# Patient Record
Sex: FEMALE | NOT HISPANIC OR LATINO | Employment: STUDENT | URBAN - METROPOLITAN AREA
[De-identification: names, ages, dates, MRNs, and addresses within clinical notes are randomized per-mention and may not be internally consistent; named-entity substitution may affect disease eponyms.]

---
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
## 2019-03-10 DIAGNOSIS — F41.9 ANXIETY AND DEPRESSION: ICD-10-CM

## 2019-03-10 DIAGNOSIS — F32.A ANXIETY AND DEPRESSION: ICD-10-CM

## 2019-03-11 DIAGNOSIS — G43.009 MIGRAINE WITHOUT AURA AND WITHOUT STATUS MIGRAINOSUS, NOT INTRACTABLE: ICD-10-CM

## 2019-03-11 RX ORDER — SERTRALINE HYDROCHLORIDE 100 MG/1
TABLET, FILM COATED ORAL
Qty: 30 TABLET | Refills: 3 | Status: SHIPPED | OUTPATIENT
Start: 2019-03-11 | End: 2019-07-13 | Stop reason: SDUPTHER

## 2019-03-11 RX ORDER — TOPIRAMATE 25 MG/1
25 TABLET ORAL 2 TIMES DAILY
Qty: 60 TABLET | Refills: 5 | Status: SHIPPED | OUTPATIENT
Start: 2019-03-11 | End: 2019-07-25

## 2019-03-11 NOTE — TELEPHONE ENCOUNTER
Please clarify RX, note states she started topamax twice daily, I don't think trokendi was approved.

## 2019-03-12 RX ORDER — TOPIRAMATE 25 MG/1
CAPSULE, EXTENDED RELEASE ORAL
Refills: 0 | OUTPATIENT
Start: 2019-03-12

## 2019-07-13 DIAGNOSIS — F32.A ANXIETY AND DEPRESSION: ICD-10-CM

## 2019-07-13 DIAGNOSIS — F41.9 ANXIETY AND DEPRESSION: ICD-10-CM

## 2019-07-15 RX ORDER — SERTRALINE HYDROCHLORIDE 100 MG/1
TABLET, FILM COATED ORAL
Qty: 30 TABLET | Refills: 2 | Status: SHIPPED | OUTPATIENT
Start: 2019-07-15 | End: 2019-07-25

## 2019-07-25 ENCOUNTER — OFFICE VISIT (OUTPATIENT)
Dept: FAMILY MEDICINE CLINIC | Facility: CLINIC | Age: 19
End: 2019-07-25

## 2019-07-25 VITALS
HEART RATE: 102 BPM | SYSTOLIC BLOOD PRESSURE: 142 MMHG | OXYGEN SATURATION: 100 % | TEMPERATURE: 97.8 F | RESPIRATION RATE: 20 BRPM | WEIGHT: 270 LBS | BODY MASS INDEX: 43.39 KG/M2 | DIASTOLIC BLOOD PRESSURE: 80 MMHG | HEIGHT: 66 IN

## 2019-07-25 DIAGNOSIS — G43.009 MIGRAINE WITHOUT AURA AND WITHOUT STATUS MIGRAINOSUS, NOT INTRACTABLE: ICD-10-CM

## 2019-07-25 DIAGNOSIS — G43.009 MIGRAINE WITHOUT AURA AND WITHOUT STATUS MIGRAINOSUS, NOT INTRACTABLE: Primary | ICD-10-CM

## 2019-07-25 DIAGNOSIS — F32.A ANXIETY AND DEPRESSION: Primary | ICD-10-CM

## 2019-07-25 DIAGNOSIS — F41.9 ANXIETY AND DEPRESSION: Primary | ICD-10-CM

## 2019-07-25 PROCEDURE — 99213 OFFICE O/P EST LOW 20 MIN: CPT | Performed by: NURSE PRACTITIONER

## 2019-07-25 RX ORDER — TOPIRAMATE 50 MG/1
50 CAPSULE, EXTENDED RELEASE ORAL
Qty: 30 CAPSULE | Refills: 5 | Status: SHIPPED | OUTPATIENT
Start: 2019-07-25 | End: 2019-10-30

## 2019-07-25 RX ORDER — ESCITALOPRAM OXALATE 10 MG/1
10 TABLET ORAL DAILY
Qty: 30 TABLET | Refills: 1 | Status: SHIPPED | OUTPATIENT
Start: 2019-07-25 | End: 2020-01-14

## 2019-07-25 NOTE — PROGRESS NOTES
Subjective   Gorge Barksdale is a 19 y.o. female presents with increased anxiety and panic attacks. 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 She is currently taking zoloft 100 mg, states it worked initially, but no significant change since it was increased.     She is also having more headaches. She stopped taking topamax as her insurance would not cover this medication. Still having migraines. Interested in a neuro referral.    History of Present Illness     The following portions of the patient's history were reviewed and updated as appropriate: allergies, current medications, past family history, past medical history, past social history, past surgical history and problem list.    Review of Systems   Constitutional: Negative.    Eyes: Negative.    Respiratory: Negative.    Cardiovascular: Negative.    Gastrointestinal: Negative.    Endocrine: Negative.    Genitourinary: Negative.    Musculoskeletal: Negative.    Skin: Negative.    Allergic/Immunologic: Negative.    Neurological: Positive for headaches.   Hematological: Negative.    Psychiatric/Behavioral: Positive for decreased concentration and dysphoric mood. Negative for self-injury, sleep disturbance and suicidal ideas. The patient is nervous/anxious. The patient is not hyperactive.        Objective   Physical Exam   Constitutional: She is oriented to person, place, and time. She appears well-developed and well-nourished.   Neurological: She is alert and oriented to person, place, and time.   Skin: Skin is warm and dry.   Psychiatric: She has a normal mood and affect.   Vitals reviewed.      Assessment/Plan   Gorge was seen today for headache.    Diagnoses and all orders for this visit:    Anxiety and depression    Migraine without aura and without status migrainosus, not intractable        D/c zoloft  Instructed to take half tab x 7 days, if tolerating and d/c  Then start lexapro 10 mg daily  Follow up in one month, consider  increase in lexapro    Start trokendi 50 mg daily, rx card given, should be zero out of pocket, will change to topiramate if needed due to coverage  Recommend patient follow up with [facility] for further eval of anxiety/depression as well as counseling  Continue maxalt  Recommend labs in future, ok to obtain with derm labs,

## 2019-07-30 RX ORDER — NORGESTIMATE AND ETHINYL ESTRADIOL 0.25-0.035
1 KIT ORAL DAILY
Qty: 84 TABLET | Refills: 3 | Status: SHIPPED | OUTPATIENT
Start: 2019-07-30 | End: 2020-01-14

## 2019-10-14 ENCOUNTER — TELEPHONE (OUTPATIENT)
Dept: FAMILY MEDICINE CLINIC | Facility: CLINIC | Age: 19
End: 2019-10-14

## 2019-10-14 NOTE — TELEPHONE ENCOUNTER
Left voicemail for patient to call the office so we could discuss this issue.     ----- Message from Latanya Peter MA sent at 10/11/2019  4:48 PM EDT -----      ----- Message -----  From: Miri Segundo APRN  Sent: 10/10/2019   3:26 PM  To: KIAH Pike Dr. will not see patient without imaging. Does she have any imaging (CT/MRI of her head) previously with another provider? If not, she will need to come in for a visit to discuss her symptoms and determine which testing is warranted.    Miri  ----- Message -----  From: Iris Newman  Sent: 10/10/2019   2:06 PM  To: YAZMIN Mays    Per Dr Benitez office pt needs imaging before they will see her ... Please advise  ----- Message -----  From: Miri Segundo APRN  Sent: 10/10/2019  11:48 AM  To: Iris Newman    Can you follow up with a referral to neurology? Patient has not heard anything. It was for [facility], Dr. Kidd, I believe.     Thanks,    Miri

## 2019-10-16 RX ORDER — SERTRALINE HYDROCHLORIDE 100 MG/1
TABLET, FILM COATED ORAL
Qty: 30 TABLET | Refills: 0 | Status: SHIPPED | OUTPATIENT
Start: 2019-10-16 | End: 2019-11-11 | Stop reason: SDUPTHER

## 2019-10-30 RX ORDER — TOPIRAMATE 50 MG/1
50 TABLET, FILM COATED ORAL 2 TIMES DAILY
Qty: 60 TABLET | Refills: 3 | Status: SHIPPED | OUTPATIENT
Start: 2019-10-30 | End: 2020-01-14

## 2019-11-12 RX ORDER — SERTRALINE HYDROCHLORIDE 100 MG/1
TABLET, FILM COATED ORAL
Qty: 30 TABLET | Refills: 0 | Status: SHIPPED | OUTPATIENT
Start: 2019-11-12 | End: 2019-12-30

## 2019-12-30 RX ORDER — SERTRALINE HYDROCHLORIDE 100 MG/1
TABLET, FILM COATED ORAL
Qty: 30 TABLET | Refills: 3 | Status: SHIPPED | OUTPATIENT
Start: 2019-12-30 | End: 2020-01-14

## 2020-01-14 ENCOUNTER — OFFICE VISIT (OUTPATIENT)
Dept: FAMILY MEDICINE CLINIC | Facility: CLINIC | Age: 20
End: 2020-01-14

## 2020-01-14 VITALS
OXYGEN SATURATION: 99 % | SYSTOLIC BLOOD PRESSURE: 122 MMHG | DIASTOLIC BLOOD PRESSURE: 80 MMHG | TEMPERATURE: 98.7 F | HEART RATE: 114 BPM | BODY MASS INDEX: 43.17 KG/M2 | WEIGHT: 267.3 LBS

## 2020-01-14 DIAGNOSIS — F32.A ANXIETY AND DEPRESSION: ICD-10-CM

## 2020-01-14 DIAGNOSIS — G43.009 MIGRAINE WITHOUT AURA AND WITHOUT STATUS MIGRAINOSUS, NOT INTRACTABLE: ICD-10-CM

## 2020-01-14 DIAGNOSIS — F41.9 ANXIETY AND DEPRESSION: ICD-10-CM

## 2020-01-14 DIAGNOSIS — L40.9 PSORIASIS: Primary | ICD-10-CM

## 2020-01-14 PROCEDURE — 99214 OFFICE O/P EST MOD 30 MIN: CPT | Performed by: NURSE PRACTITIONER

## 2020-01-14 RX ORDER — RIZATRIPTAN BENZOATE 10 MG/1
10 TABLET ORAL ONCE AS NEEDED
Qty: 10 TABLET | Refills: 2 | Status: SHIPPED | OUTPATIENT
Start: 2020-01-14 | End: 2021-02-02

## 2020-01-14 RX ORDER — CLOBETASOL PROPIONATE 0.5 MG/G
CREAM TOPICAL 2 TIMES DAILY
Qty: 60 G | Refills: 2 | Status: SHIPPED | OUTPATIENT
Start: 2020-01-14

## 2020-01-14 RX ORDER — TOPIRAMATE 100 MG/1
100 TABLET, FILM COATED ORAL 2 TIMES DAILY
Qty: 180 TABLET | Refills: 2 | Status: SHIPPED | OUTPATIENT
Start: 2020-01-14 | End: 2020-11-18

## 2020-01-14 RX ORDER — SERTRALINE HYDROCHLORIDE 100 MG/1
150 TABLET, FILM COATED ORAL DAILY
Qty: 135 TABLET | Refills: 0 | Status: SHIPPED | OUTPATIENT
Start: 2020-01-14 | End: 2020-04-13

## 2020-01-14 NOTE — PROGRESS NOTES
Subjective   Gorge Barksdale is a 19 y.o. female   who presents for   Chief Complaint   Patient presents with   • Rash     x3wks left side of body and both arms           /80   Pulse 114   Temp 98.7 °F (37.1 °C)   Wt 121 kg (267 lb 4.8 oz)   SpO2 99%   BMI 43.17 kg/m²       History of Present Illness   Rash   This is a new problem. The current episode started 1 to 4 weeks ago. The problem has been waxing and waning since onset. The affected locations include theneck, chest, back, abdomen, left arm, left hand, left wrist, right arm, right hand and right wrist. The rash is characterized by dryness, redness, itchiness, peeling and scaling. She was exposed to a new detergent/soap. Pertinent negatives include no anorexia, congestion, cough, diarrhea, eye pain, facial edema, fatigue, fever, joint pain, nail changes, rhinorrhea, shortness of breath, sore throat or vomiting. Past treatments include nothing. The treatment provided no relief. (Psoriasis)   Anxiety   Presents for follow-up visit. Symptoms include depressed mood (when at home), excessive worry and nervous/anxious behavior. Patient reports no chest pain, compulsions, confusion, decreased concentration, dizziness, dry mouth, feeling of choking, hyperventilation, impotence, insomnia, irritability, malaise, muscle tension, nausea, obsessions, palpitations, panic, restlessness, shortness of breath or suicidal ideas. Symptoms occur most days. The severity of symptoms is moderate. The quality of sleep is good.     (Psoriasis)       The following portions of the patient's history were reviewed and updated as appropriate: allergies, current medications, past family history, past medical history, past social history, past surgical history and problem list.    Review of Systems  Review of Systems   Constitutional: Negative for fatigue, fever and irritability.   HENT: Negative for congestion, rhinorrhea and sore throat.    Eyes: Negative for pain.   Respiratory:  Negative for cough and shortness of breath.    Cardiovascular: Negative for chest pain and palpitations.   Gastrointestinal: Negative for anorexia, diarrhea, nausea and vomiting.   Genitourinary: Negative for impotence.   Musculoskeletal: Negative for joint pain.   Skin: Positive for rash. Negative for nail changes.   Neurological: Positive for headaches. Negative for dizziness.   Psychiatric/Behavioral: Positive for dysphoric mood. Negative for confusion, decreased concentration and suicidal ideas. The patient is nervous/anxious. The patient does not have insomnia.        Objective   Physical Exam  Physical Exam   Constitutional: She is oriented to person, place, and time. She appears well-developed and well-nourished. No distress.   Neck: Neck supple.   Cardiovascular: Normal rate, regular rhythm and normal heart sounds. Exam reveals no gallop and no friction rub.   No murmur heard.  Pulmonary/Chest: Effort normal and breath sounds normal. No respiratory distress. She has no wheezes. She has no rales.   Abdominal: Soft. Bowel sounds are normal. She exhibits no distension. There is no tenderness.   Neurological: She is alert and oriented to person, place, and time.   Skin: Skin is warm and dry. Rash noted. She is not diaphoretic.   Bilateral arms and chest  Dry, erythematous, no plaque, no vesicles, pruritic     Psychiatric: She has a normal mood and affect.         Assessment/Plan   Gorge was seen today for rash.    Diagnoses and all orders for this visit:    Psoriasis    Anxiety and depression    Migraine without aura and without status migrainosus, not intractable    Other orders  -     clobetasol (TEMOVATE) 0.05 % cream; Apply  topically to the appropriate area as directed 2 (Two) Times a Day.  -     sertraline (ZOLOFT) 100 MG tablet; Take 1.5 tablets by mouth Daily.  -     rizatriptan (MAXALT) 10 MG tablet; Take 1 tablet by mouth 1 (One) Time As Needed for Migraine for up to 1 dose. May repeat in 2 hours if  needed  -     topiramate (TOPAMAX) 100 MG tablet; Take 1 tablet by mouth 2 (Two) Times a Day.    hx of psoriasis, resume clobetasol, for continued/worsening symptoms, referral to derm  Does have increased anxiety and depression  Will increase zoloft to 150 mg daily to better improve symptoms  Will increase topamax to 100 mg twice daily, slow taper given  Follow up for worsening or no improvement in symptoms

## 2020-01-14 NOTE — PROGRESS NOTES
Answers for HPI/ROS submitted by the patient on 1/13/2020   Rash  Chronicity: new  Onset: 1 to 4 weeks ago  Progression since onset: waxing and waning  Affected locations: neck, chest, back, abdomen, left arm, left hand, left wrist, right arm, right hand, right wrist  Characteristics: dryness, redness, itchiness, peeling, scaling  Exposed to: a new detergent/soap  anorexia: No  congestion: No  cough: No  diarrhea: No  eye pain: No  facial edema: No  fatigue: No  fever: No  joint pain: No  nail changes: No  rhinorrhea: No  shortness of breath: No  sore throat: No  vomiting: No

## 2020-01-14 NOTE — PATIENT INSTRUCTIONS
Increase topiramate to 50 mg in am and 75 mg in pm x 7 days  Then increase to 75 mg twice daily x 7 days  Then start new prescription    Increase zoloft to 1 1/2 tab daily as directed    Maxalt, increase to 10 mg as needed for migraines    Clobetasol, twice daily as needed for rash  Follow up for no improvement/worsening symptoms

## 2020-02-14 ENCOUNTER — OFFICE VISIT (OUTPATIENT)
Dept: FAMILY MEDICINE CLINIC | Facility: CLINIC | Age: 20
End: 2020-02-14

## 2020-02-14 VITALS
RESPIRATION RATE: 16 BRPM | BODY MASS INDEX: 43.74 KG/M2 | TEMPERATURE: 98.9 F | HEIGHT: 65 IN | HEART RATE: 112 BPM | WEIGHT: 262.5 LBS | OXYGEN SATURATION: 98 %

## 2020-02-14 DIAGNOSIS — L40.0 PLAQUE PSORIASIS: ICD-10-CM

## 2020-02-14 DIAGNOSIS — G43.009 MIGRAINE WITHOUT AURA AND WITHOUT STATUS MIGRAINOSUS, NOT INTRACTABLE: ICD-10-CM

## 2020-02-14 DIAGNOSIS — Z82.61 FAMILY HISTORY OF RHEUMATOID ARTHRITIS: ICD-10-CM

## 2020-02-14 DIAGNOSIS — M25.50 POLYARTHRALGIA: ICD-10-CM

## 2020-02-14 DIAGNOSIS — Z13.29 SCREENING FOR THYROID DISORDER: ICD-10-CM

## 2020-02-14 DIAGNOSIS — Z79.899 MEDICATION MANAGEMENT: ICD-10-CM

## 2020-02-14 DIAGNOSIS — Z13.21 ENCOUNTER FOR VITAMIN DEFICIENCY SCREENING: ICD-10-CM

## 2020-02-14 DIAGNOSIS — F32.A ANXIETY AND DEPRESSION: Primary | ICD-10-CM

## 2020-02-14 DIAGNOSIS — F41.9 ANXIETY AND DEPRESSION: Primary | ICD-10-CM

## 2020-02-14 DIAGNOSIS — Z13.220 SCREENING FOR LIPOID DISORDERS: ICD-10-CM

## 2020-02-14 DIAGNOSIS — Z82.61 FAMILY HISTORY OF JUVENILE RHEUMATOID ARTHRITIS: ICD-10-CM

## 2020-02-14 PROCEDURE — 99214 OFFICE O/P EST MOD 30 MIN: CPT | Performed by: NURSE PRACTITIONER

## 2020-02-14 NOTE — PROGRESS NOTES
"Subjective   Gorge Barksdale is a 19 y.o. female   who presents for   Chief Complaint   Patient presents with   • Anxiety     F/u   • Depression     Medications helping  Increased acid reflux, has noticed with increase in zoloft    Maxalt, for migraines    Recent hand/foot mouth after last visit    Started topamax slowly    Knee pain, joint pain generalized, started elementary/middle school, noticed with someone touching her arm and feeling increased sensation, and prolonged, worse over time  Sometimes clothes will bother her skin, concerned for fibromyalgia  Family hx of RA and lupus    Started counseling, thinks she has acute stress disorder  Discussed psychiatry, has not established care, [place] [facility], has not made an appointment,     Parasthesias, on topamax, intermittent, not constant        Pulse 112   Temp 98.9 °F (37.2 °C)   Resp 16   Ht 165.1 cm (65\")   Wt 119 kg (262 lb 8 oz)   SpO2 98%   BMI 43.68 kg/m²       History of Present Illness   Anxiety   Presents for follow-up visit. Symptoms include depressed mood, excessive worry, nervous/anxious behavior and panic. Patient reports no chest pain, dizziness, insomnia, nausea, shortness of breath or suicidal ideas. The severity of symptoms is interfering with daily activities. The quality of sleep is fair. Nighttime awakenings: occasional.     Compliance with medications is %.   Migraine    This is a chronic problem. The current episode started more than 1 year ago. The problem occurs intermittently. The problem has been waxing and waning. The pain does not radiate. The pain quality is similar to prior headaches. The pain is at a severity of 0/10. The patient is experiencing no pain. Associated symptoms include phonophobia and photophobia. Pertinent negatives include no abdominal pain, abnormal behavior, anorexia, back pain, blurred vision, coughing, dizziness, drainage, ear pain, eye pain, eye redness, eye watering, facial sweating, fever, " hearing loss, insomnia, loss of balance, muscle aches, nausea, neck pain, numbness, rhinorrhea, scalp tenderness, seizures, sinus pressure, sore throat, swollen glands, tingling (related to topamax), tinnitus, visual change, vomiting, weakness or weight loss. The symptoms are aggravated by bright light. She has tried triptans (topa,ax) for the symptoms. The treatment provided moderate relief. Her past medical history is significant for migraine headaches.   Pain   This is a chronic problem. The current episode started more than 1 year ago. The problem occurs intermittently. The problem has been waxing and waning. Associated symptoms include arthralgias (polyarthralgia) and fatigue. Pertinent negatives include no abdominal pain, anorexia, change in bowel habit, chest pain, chills, congestion, coughing, diaphoresis, fever, headaches, joint swelling, myalgias, nausea, neck pain, numbness, rash, sore throat, swollen glands, urinary symptoms, vertigo, visual change, vomiting or weakness. Nothing aggravates the symptoms. She has tried nothing for the symptoms. The treatment provided no relief.       The following portions of the patient's history were reviewed and updated as appropriate: allergies, current medications, past family history, past medical history, past social history, past surgical history and problem list.    Review of Systems  Review of Systems   Constitutional: Positive for fatigue. Negative for chills, diaphoresis, fever and weight loss.   HENT: Negative for congestion, ear pain, hearing loss, rhinorrhea, sinus pressure, sore throat and tinnitus.    Eyes: Positive for photophobia. Negative for blurred vision, pain and redness.   Respiratory: Negative for cough and shortness of breath.    Cardiovascular: Negative for chest pain.   Gastrointestinal: Negative for abdominal pain, anorexia, change in bowel habit, nausea and vomiting.   Musculoskeletal: Positive for arthralgias (polyarthralgia). Negative for  back pain, joint swelling, myalgias and neck pain.   Skin: Negative for rash.   Neurological: Negative for dizziness, vertigo, tingling (related to topamax), seizures, weakness, numbness, headaches and loss of balance.   Psychiatric/Behavioral: Negative for suicidal ideas. The patient is nervous/anxious. The patient does not have insomnia.        Objective   Physical Exam  Physical Exam   Constitutional: She is oriented to person, place, and time. She appears well-developed and well-nourished. No distress.   Neck: Neck supple.   Cardiovascular: Normal rate, regular rhythm and normal heart sounds. Exam reveals no gallop and no friction rub.   No murmur heard.  Pulmonary/Chest: Effort normal and breath sounds normal. No respiratory distress. She has no wheezes. She has no rales.   Abdominal: Soft. Bowel sounds are normal. She exhibits no distension. There is no tenderness.   Neurological: She is alert and oriented to person, place, and time.   Skin: Skin is warm and dry. She is not diaphoretic.   Psychiatric: She has a normal mood and affect.         Assessment/Plan   Gorge was seen today for anxiety and depression.    Diagnoses and all orders for this visit:    Anxiety and depression    Migraine without aura and without status migrainosus, not intractable    Plaque psoriasis    Family history of juvenile rheumatoid arthritis    Family history of rheumatoid arthritis  -     C-reactive Protein; Future  -     Rheumatoid Factor, Quant; Future  -     Rheumatoid Factor, Quant  -     C-reactive Protein    Polyarthralgia  -     KATHARINA; Future  -     C-reactive Protein; Future  -     Rheumatoid Factor, Quant; Future  -     Rheumatoid Factor, Quant  -     C-reactive Protein  -     KATHARINA    Screening for thyroid disorder  -     TSH Rfx On Abnormal To Free T4; Future  -     TSH Rfx On Abnormal To Free T4    Encounter for vitamin deficiency screening  -     Vitamin D 25 hydroxy; Future  -     Vitamin D 25 hydroxy    Medication  management  -     CBC & Differential; Future  -     Comprehensive metabolic panel; Future  -     Comprehensive metabolic panel  -     CBC & Differential    Screening for lipoid disorders  -     Lipid Panel With LDL / HDL Ratio; Future  -     Lipid Panel With LDL / HDL Ratio    if autoimmune work up appears wnl, would consider cymbalta for suspected fibromyalgia  Currently taking zoloft and doing well, discussed with patient and agreeable with recommendation, states her mom is currently on that medication  Labs in future, anxiety related to giving blood, will give time to prepare  Continue current medication for migraines and anxiety at present time  Hx of psoriasis, managed well currently    Time spent > 25 minutes discussing medications, labs, treatment process and next steps    Answers for HPI/ROS submitted by the patient on 2/11/2020   What is the primary reason for your visit?: Other  Please describe your symptoms.: Medication follow up  Have you had these symptoms before?: No

## 2020-04-13 RX ORDER — SERTRALINE HYDROCHLORIDE 100 MG/1
TABLET, FILM COATED ORAL
Qty: 45 TABLET | Refills: 3 | Status: SHIPPED | OUTPATIENT
Start: 2020-04-13 | End: 2020-09-30 | Stop reason: SDUPTHER

## 2020-09-30 RX ORDER — SERTRALINE HYDROCHLORIDE 100 MG/1
150 TABLET, FILM COATED ORAL DAILY
Qty: 135 TABLET | Refills: 1 | Status: SHIPPED | OUTPATIENT
Start: 2020-09-30 | End: 2021-04-14

## 2020-11-18 RX ORDER — TOPIRAMATE 100 MG/1
TABLET, FILM COATED ORAL
Qty: 60 TABLET | Refills: 4 | Status: SHIPPED | OUTPATIENT
Start: 2020-11-18 | End: 2021-05-19 | Stop reason: SDUPTHER

## 2021-02-02 RX ORDER — RIZATRIPTAN BENZOATE 10 MG/1
TABLET ORAL
Qty: 10 TABLET | Refills: 1 | Status: SHIPPED | OUTPATIENT
Start: 2021-02-02 | End: 2022-04-19 | Stop reason: SDUPTHER

## 2021-04-14 RX ORDER — SERTRALINE HYDROCHLORIDE 100 MG/1
TABLET, FILM COATED ORAL
Qty: 45 TABLET | Refills: 0 | Status: SHIPPED | OUTPATIENT
Start: 2021-04-14 | End: 2021-05-19 | Stop reason: SDUPTHER

## 2021-05-19 ENCOUNTER — OFFICE VISIT (OUTPATIENT)
Dept: FAMILY MEDICINE CLINIC | Facility: CLINIC | Age: 21
End: 2021-05-19

## 2021-05-19 VITALS
DIASTOLIC BLOOD PRESSURE: 84 MMHG | TEMPERATURE: 96.6 F | HEIGHT: 65 IN | BODY MASS INDEX: 47.42 KG/M2 | HEART RATE: 109 BPM | WEIGHT: 284.6 LBS | OXYGEN SATURATION: 99 % | SYSTOLIC BLOOD PRESSURE: 106 MMHG | RESPIRATION RATE: 16 BRPM

## 2021-05-19 DIAGNOSIS — Z30.02 ENCOUNTER FOR COUNSELING AND INSTRUCTION IN NATURAL FAMILY PLANNING TO AVOID PREGNANCY: ICD-10-CM

## 2021-05-19 DIAGNOSIS — F32.A ANXIETY AND DEPRESSION: Primary | ICD-10-CM

## 2021-05-19 DIAGNOSIS — F41.9 ANXIETY AND DEPRESSION: Primary | ICD-10-CM

## 2021-05-19 DIAGNOSIS — G43.009 MIGRAINE WITHOUT AURA AND WITHOUT STATUS MIGRAINOSUS, NOT INTRACTABLE: ICD-10-CM

## 2021-05-19 PROCEDURE — 99214 OFFICE O/P EST MOD 30 MIN: CPT | Performed by: NURSE PRACTITIONER

## 2021-05-19 RX ORDER — SERTRALINE HYDROCHLORIDE 100 MG/1
150 TABLET, FILM COATED ORAL DAILY
Qty: 135 TABLET | Refills: 3 | Status: SHIPPED | OUTPATIENT
Start: 2021-05-19 | End: 2022-04-19 | Stop reason: SDUPTHER

## 2021-05-19 RX ORDER — TOPIRAMATE 100 MG/1
100 TABLET, FILM COATED ORAL 2 TIMES DAILY
Qty: 180 TABLET | Refills: 3 | Status: SHIPPED | OUTPATIENT
Start: 2021-05-19 | End: 2022-04-19 | Stop reason: SDUPTHER

## 2021-05-19 NOTE — PROGRESS NOTES
"Chief Complaint  Med Management    Subjective          Gorge Barksdale presents to [facility]  Recently diagnosed with bipolar I, started on lamictal 25 mg, states she felt heavy and was unable to get out of bed  Having increased anxiety, concerned with sleep, insomnia, trouble sleeping prolonged periods, does sleep in the day at times, now lives with her boyfriend and is having trouble sleeping when he is gone, he works night shifts.     Her migraines have worsened over the past several months, taking topamax 100 mg twice daily, and it did work initially, but attributes lack of sleep to the increase in headaches.            Anxiety  Presents for follow-up visit. Symptoms include decreased concentration, depressed mood (mild), excessive worry, irritability, nervous/anxious behavior, panic and restlessness. Patient reports no shortness of breath or suicidal ideas. Symptoms occur most days. The quality of sleep is poor. Nighttime awakenings: several.     Compliance with medications: takes daily. Treatment side effects: sexual        Objective   Vital Signs:   /84   Pulse 109   Temp 96.6 °F (35.9 °C) (Temporal)   Resp 16   Ht 165.1 cm (65\")   Wt 129 kg (284 lb 9.6 oz)   SpO2 99%   BMI 47.36 kg/m²     Physical Exam  Vitals reviewed.   Constitutional:       Appearance: Normal appearance. She is obese.   Cardiovascular:      Rate and Rhythm: Normal rate and regular rhythm.      Heart sounds: No murmur heard.   No friction rub. No gallop.    Pulmonary:      Effort: Pulmonary effort is normal. No respiratory distress.      Breath sounds: Normal breath sounds. No wheezing, rhonchi or rales.   Abdominal:      General: Bowel sounds are normal. There is no distension.      Palpations: There is no mass.      Tenderness: There is no abdominal tenderness.      Hernia: No hernia is present.   Neurological:      Mental Status: She is alert and oriented to person, place, and time. "   Psychiatric:         Mood and Affect: Mood normal.        Result Review :                 Assessment and Plan {CC Problem List  Visit Diagnosis   ROS  Review (Popup)  Health Maintenance  Quality  BestPractice  Medications  SmartSets  SnapShot Encounters  Media :23}   Diagnoses and all orders for this visit:    1. Anxiety and depression (Primary)    2. Migraine without aura and without status migrainosus, not intractable    3. Encounter for counseling and instruction in natural family planning to avoid pregnancy    Other orders  -     topiramate (TOPAMAX) 100 MG tablet; Take 1 tablet by mouth 2 (Two) Times a Day.  Dispense: 180 tablet; Refill: 3  -     sertraline (ZOLOFT) 100 MG tablet; Take 1.5 tablets by mouth Daily.  Dispense: 135 tablet; Refill: 3        Follow Up   No follow-ups on file.  Patient was given instructions and counseling regarding her condition or for health maintenance advice. Please see specific information pulled into the AVS if appropriate.     Will continue current medication  She did establish with psychiatry, diagnosed with bipolar I, started on lamictal but was unable to tolerate the dose. She was told her symptoms were related to her anxiety, and she was frustrated and did not go back.   I do recommend patient establish with psychiatry, Dr. Fish, [facility],   Discussed ADD vs Bipolar and she states she has considered ADD, but her mom told her she did not have that in comparison to her brother. Advised boys/men present differently than females, and it is worth evaluating.   She is having sexual side effects on the zoloft, would consider changing, but she is reluctant, again will defer to psychiatry  Insomnia: she is interested in a sleep aid, recommend melatonin, she has previously tried 10 mg, recommend 2.5-3 mg gummy or ODT 45 minutes before bedtime, instructed to turn off t.v. and remove cell phone from immediate area x 45 minutes as well as the blue light  stimulates the brain.   She is interested in OCP, however she is taking topamax 200 mg and effects could be reduced.  Answers for HPI/ROS submitted by the patient on 5/15/2021  Please describe your symptoms.: Medication check up  Have you had these symptoms before?: No  How long have you been having these symptoms?: 1-4 days  What is the primary reason for your visit?: Other

## 2021-05-20 RX ORDER — ACETAMINOPHEN AND CODEINE PHOSPHATE 120; 12 MG/5ML; MG/5ML
1 SOLUTION ORAL DAILY
Qty: 28 TABLET | Refills: 12 | Status: SHIPPED | OUTPATIENT
Start: 2021-05-20 | End: 2022-04-19 | Stop reason: SDUPTHER

## 2021-07-28 RX ORDER — FLUCONAZOLE 150 MG/1
TABLET ORAL
Qty: 2 TABLET | Refills: 0 | Status: SHIPPED | OUTPATIENT
Start: 2021-07-28 | End: 2023-01-03

## 2022-01-04 ENCOUNTER — TELEPHONE (OUTPATIENT)
Dept: FAMILY MEDICINE CLINIC | Facility: CLINIC | Age: 22
End: 2022-01-04

## 2022-01-04 NOTE — TELEPHONE ENCOUNTER
Caller: LITA     Relationship to patient: GRANDMOTHER     Best call back number: [phone]    Date of positive COVID19 test: 1-3-22    COVID19 symptoms: CONGESTION, CHEST HURTS FROM COUGHING     Additional information or concerns: GRANDMOTHER IS NOT ON VERBAL, STATED SHE CAN GET JOSIE' MOTHER ON THE PHONE IF NEEDED.   VERY CONCERNED ABOUT PATIENT, ASKING IF THERE IS ANYTHING GRANDMOTHER CAN DO TO HELP RELIEVE SYMPTOMS.    What is the patients preferred pharmacy:   [id number] [address] [phone] [place] [phone]

## 2022-01-04 NOTE — TELEPHONE ENCOUNTER
Spoke to Patient's Mom. Pt's symptoms started Sunday, she he hasn't been able to really eat or drink and she is throwing stuff back up.She is also SOB. After discussing with Miri we have decided to send them to the ER for further Eval and IV hydration. They verified understanding

## 2022-04-02 ENCOUNTER — HOSPITAL ENCOUNTER (EMERGENCY)
Facility: HOSPITAL | Age: 22
Discharge: HOME | End: 2022-04-02
Attending: EMERGENCY MEDICINE | Admitting: EMERGENCY MEDICINE

## 2022-04-02 VITALS
HEIGHT: 65 IN | RESPIRATION RATE: 16 BRPM | HEART RATE: 91 BPM | OXYGEN SATURATION: 100 % | TEMPERATURE: 97.7 F | BODY MASS INDEX: 43.01 KG/M2 | SYSTOLIC BLOOD PRESSURE: 115 MMHG | DIASTOLIC BLOOD PRESSURE: 74 MMHG | WEIGHT: 258.16 LBS

## 2022-04-02 DIAGNOSIS — F41.0 ANXIETY DISORDER DUE TO GENERAL MEDICAL CONDITION WITH PANIC ATTACK: Primary | ICD-10-CM

## 2022-04-02 DIAGNOSIS — F06.4 ANXIETY DISORDER DUE TO GENERAL MEDICAL CONDITION WITH PANIC ATTACK: Primary | ICD-10-CM

## 2022-04-02 LAB — SARS-COV-2 RNA PNL SPEC NAA+PROBE: NOT DETECTED

## 2022-04-02 PROCEDURE — 99283 EMERGENCY DEPT VISIT LOW MDM: CPT

## 2022-04-02 PROCEDURE — C9803 HOPD COVID-19 SPEC COLLECT: HCPCS | Performed by: EMERGENCY MEDICINE

## 2022-04-02 PROCEDURE — 87635 SARS-COV-2 COVID-19 AMP PRB: CPT | Performed by: EMERGENCY MEDICINE

## 2022-04-14 ENCOUNTER — TELEPHONE (OUTPATIENT)
Dept: FAMILY MEDICINE CLINIC | Facility: CLINIC | Age: 22
End: 2022-04-14

## 2022-04-14 NOTE — TELEPHONE ENCOUNTER
Caller: Gorge Barksdale    Relationship: Self    Best call back number: [phone]*    What is the best time to reach you: ANYTIME AFTER 12PM    Who are you requesting to speak with (clinical staff, provider,  specific staff member): YAZMIN GOLDMAN    What was the call regarding: PATIENT CALLING REQUESTING A CALL BACK FROM MICHELLE MANTILLA. THE PATIENT STATES THAT MICHELLE MANTILLA HAD GIVEN HER THE NAME OF SOMEONE THAT SHE RECOMMENDED HER TO SEE AT [facility], AND THE PATIENT CANNOT REMEMBER WHO IT WAS.    Do you require a callback: YES

## 2022-04-19 ENCOUNTER — OFFICE VISIT (OUTPATIENT)
Dept: FAMILY MEDICINE CLINIC | Facility: CLINIC | Age: 22
End: 2022-04-19

## 2022-04-19 VITALS
OXYGEN SATURATION: 98 % | BODY MASS INDEX: 43.07 KG/M2 | HEART RATE: 77 BPM | WEIGHT: 258.5 LBS | HEIGHT: 65 IN | TEMPERATURE: 96.9 F | DIASTOLIC BLOOD PRESSURE: 78 MMHG | SYSTOLIC BLOOD PRESSURE: 124 MMHG | RESPIRATION RATE: 18 BRPM

## 2022-04-19 DIAGNOSIS — F41.9 ANXIETY: ICD-10-CM

## 2022-04-19 DIAGNOSIS — Z00.00 ANNUAL PHYSICAL EXAM: Primary | ICD-10-CM

## 2022-04-19 PROCEDURE — 99213 OFFICE O/P EST LOW 20 MIN: CPT | Performed by: NURSE PRACTITIONER

## 2022-04-19 PROCEDURE — 99395 PREV VISIT EST AGE 18-39: CPT | Performed by: NURSE PRACTITIONER

## 2022-04-19 RX ORDER — TOPIRAMATE 100 MG/1
100 TABLET, FILM COATED ORAL 2 TIMES DAILY
Qty: 180 TABLET | Refills: 3 | Status: SHIPPED | OUTPATIENT
Start: 2022-04-19

## 2022-04-19 RX ORDER — ACETAMINOPHEN AND CODEINE PHOSPHATE 120; 12 MG/5ML; MG/5ML
1 SOLUTION ORAL DAILY
Qty: 28 TABLET | Refills: 12 | Status: SHIPPED | OUTPATIENT
Start: 2022-04-19 | End: 2023-04-19

## 2022-04-19 RX ORDER — RIZATRIPTAN BENZOATE 10 MG/1
10 TABLET ORAL ONCE
Qty: 10 TABLET | Refills: 1 | Status: SHIPPED | OUTPATIENT
Start: 2022-04-19 | End: 2022-04-19

## 2022-04-19 RX ORDER — HYDROXYZINE HYDROCHLORIDE 10 MG/1
10 TABLET, FILM COATED ORAL EVERY 8 HOURS PRN
Qty: 60 TABLET | Refills: 0 | Status: SHIPPED | OUTPATIENT
Start: 2022-04-19 | End: 2022-07-28

## 2022-04-19 RX ORDER — SERTRALINE HYDROCHLORIDE 100 MG/1
150 TABLET, FILM COATED ORAL DAILY
Qty: 135 TABLET | Refills: 3 | Status: SHIPPED | OUTPATIENT
Start: 2022-04-19 | End: 2023-01-03

## 2022-04-19 NOTE — PROGRESS NOTES
"Chief Complaint  Anxiety and Depression    Subjective          Gorge Barksdale presents to [facility]  Gorge is a 21 year old female presenting for an annual exam and medication follow up. Her medical history includes anxiety and panic attacks. She is currently taking Zoloft 150 mg daily. The Zoloft has helped most with her social anxiety. She reports that she would not be able to do things like date or attend medical appointments without Zoloft. She reports having anywhere from 1-3 panic attacks daily. Some panic attacks have been associated with vomiting and diarrhea. She went to the ER for a \"mental breakdown\" on 04/02 where she was evaluated by [facility] who recommended an intensive outpatient partial hospitalization program. She did not receive any new prescriptions. She has social anxiety and is not interested in doing group therapy through [facility]. The outpatient program was also too expensive. She plans to call psychiatry to schedule an appointment today. She feels like her anxiety is causing some depression that occurs on and off. She denies any thoughts of harming or killing herself. She has more migraine headaches when she is depressed but migraines are otherwise well controlled. She is due for pap smear but would like to hold off on that and other lab work until her anxiety is well controlled. She is up to date on all immunizations but unsure about date of last Tdap.           Objective   Vital Signs:   /78 (BP Location: Left arm, Patient Position: Sitting, Cuff Size: Adult)   Pulse 77   Temp 96.9 °F (36.1 °C) (Temporal)   Resp 18   Ht 165.1 cm (65\")   Wt 117 kg (258 lb 8 oz)   SpO2 98%   BMI 43.02 kg/m²            Physical Exam  Constitutional:       Appearance: Normal appearance.   HENT:      Head: Normocephalic and atraumatic.      Right Ear: Tympanic membrane, ear canal and external ear normal.      Left Ear: Tympanic membrane, ear canal and external ear " normal.      Nose: Nose normal.      Mouth/Throat:      Mouth: Mucous membranes are moist.      Pharynx: Oropharynx is clear. Uvula midline.   Eyes:      Conjunctiva/sclera: Conjunctivae normal.      Pupils: Pupils are equal, round, and reactive to light.   Cardiovascular:      Rate and Rhythm: Normal rate and regular rhythm.      Heart sounds: Normal heart sounds.   Pulmonary:      Effort: Pulmonary effort is normal.      Breath sounds: Normal breath sounds. No wheezing, rhonchi or rales.   Musculoskeletal:      Cervical back: Neck supple.   Lymphadenopathy:      Cervical: No cervical adenopathy.   Skin:     General: Skin is warm and dry.   Neurological:      General: No focal deficit present.      Mental Status: She is alert and oriented to person, place, and time.   Psychiatric:         Mood and Affect: Mood normal.         Behavior: Behavior normal.        Result Review :                 Assessment and Plan    Diagnoses and all orders for this visit:    1. Annual physical exam (Primary)    2. Anxiety    Other orders  -     hydrOXYzine (ATARAX) 10 MG tablet; Take 1 tablet by mouth Every 8 (Eight) Hours As Needed for Anxiety.  Dispense: 60 tablet; Refill: 0  -     topiramate (TOPAMAX) 100 MG tablet; Take 1 tablet by mouth 2 (Two) Times a Day.  Dispense: 180 tablet; Refill: 3  -     sertraline (ZOLOFT) 100 MG tablet; Take 1.5 tablets by mouth Daily.  Dispense: 135 tablet; Refill: 3  -     rizatriptan (MAXALT) 10 MG tablet; Take 1 tablet by mouth 1 (One) Time for 1 dose. AT ONSET OF HEADACHE MAY REPEAT ONE TABLET IN 2 HOURS IF NEEDED  Dispense: 10 tablet; Refill: 1  -     norethindrone (Ortho Micronor) 0.35 MG tablet; Take 1 tablet by mouth Daily.  Dispense: 28 tablet; Refill: 12        Follow Up   No follow-ups on file.  Patient was given instructions and counseling regarding her condition or for health maintenance advice. Please see specific information pulled into the AVS if appropriate.     I supervised care  provided by the [facility] Student, Zoila Gallo. We have discussed the case. I have reviewed  the note and agree with the plan of treatment. I personally interviewed the patient and examined the patient.      Information/counseling provided to the patient regarding periodic nilam maintenance recommendations, including but not limited to immunizations, diet/exercise/healthy lifestyle, laboratory, and other screenings. BMI is discussed. Appropriate exercise, diet, and weight plans are discussed.    Anxiety: not controlled, having daily panic attacks. Currently on zoloft 150 mg daily, tried buspar but increased depression. Recommend hydroxyzine 10 mg TID prn, she is establishing care with [facility]ry at [facility], previously established with therapist but placed on hold secondary to pandemic and cost. 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 She would also like to wait on GYN    Weight is down 26 lbs from last visit  Answers for HPI/ROS submitted by the patient on 4/15/2022  Please describe your symptoms.: Medication checkup.  Have you had these symptoms before?: No  How long have you been having these symptoms?: 1-4 days  Please list any medications you are currently taking for this condition.: Zoloft  Please describe any probable cause for these symptoms. : Anxiety  What is the primary reason for your visit?: Other

## 2022-07-25 NOTE — TELEPHONE ENCOUNTER
Rx Refill Note  Requested Prescriptions     Pending Prescriptions Disp Refills   • hydrOXYzine (ATARAX) 10 MG tablet [Pharmacy Med Name: hydrOXYzine HCL 10 MG TABLET] 60 tablet 0     Sig: TAKE ONE TABLET BY MOUTH EVERY 8 HOURS AS NEEDED FOR ANXIETY      Last office visit with prescribing clinician: 4/19/2022      Next office visit with prescribing clinician: Visit date not found            Vijaya Sánchez LPN  07/25/22, 13:41 EDT

## 2022-07-28 RX ORDER — HYDROXYZINE HYDROCHLORIDE 10 MG/1
TABLET, FILM COATED ORAL
Qty: 60 TABLET | Refills: 0 | Status: SHIPPED | OUTPATIENT
Start: 2022-07-28

## 2023-01-03 ENCOUNTER — OFFICE VISIT (OUTPATIENT)
Dept: FAMILY MEDICINE CLINIC | Facility: CLINIC | Age: 23
End: 2023-01-03
Payer: COMMERCIAL

## 2023-01-03 ENCOUNTER — HOSPITAL ENCOUNTER (OUTPATIENT)
Dept: GENERAL RADIOLOGY | Facility: HOSPITAL | Age: 23
Discharge: HOME | End: 2023-01-03
Admitting: NURSE PRACTITIONER
Payer: COMMERCIAL

## 2023-01-03 VITALS
BODY MASS INDEX: 40.62 KG/M2 | WEIGHT: 243.8 LBS | OXYGEN SATURATION: 98 % | SYSTOLIC BLOOD PRESSURE: 114 MMHG | RESPIRATION RATE: 18 BRPM | DIASTOLIC BLOOD PRESSURE: 68 MMHG | HEART RATE: 113 BPM | TEMPERATURE: 97.3 F | HEIGHT: 65 IN

## 2023-01-03 DIAGNOSIS — F41.9 ANXIETY: Primary | ICD-10-CM

## 2023-01-03 DIAGNOSIS — M54.41 CHRONIC BILATERAL LOW BACK PAIN WITH BILATERAL SCIATICA: ICD-10-CM

## 2023-01-03 DIAGNOSIS — M54.42 CHRONIC BILATERAL LOW BACK PAIN WITH BILATERAL SCIATICA: ICD-10-CM

## 2023-01-03 DIAGNOSIS — M54.41 CHRONIC BILATERAL LOW BACK PAIN WITH BILATERAL SCIATICA: Primary | ICD-10-CM

## 2023-01-03 DIAGNOSIS — G89.29 CHRONIC BILATERAL LOW BACK PAIN WITH BILATERAL SCIATICA: ICD-10-CM

## 2023-01-03 DIAGNOSIS — Z78.9 HEALTH MAINTENANCE ALTERATION: ICD-10-CM

## 2023-01-03 DIAGNOSIS — G89.29 CHRONIC BILATERAL LOW BACK PAIN WITH BILATERAL SCIATICA: Primary | ICD-10-CM

## 2023-01-03 DIAGNOSIS — M54.42 CHRONIC BILATERAL LOW BACK PAIN WITH BILATERAL SCIATICA: Primary | ICD-10-CM

## 2023-01-03 DIAGNOSIS — K59.03 DRUG-INDUCED CONSTIPATION: ICD-10-CM

## 2023-01-03 DIAGNOSIS — Z83.2 FAMILY HISTORY OF AUTOIMMUNE DISORDER: ICD-10-CM

## 2023-01-03 DIAGNOSIS — G43.009 MIGRAINE WITHOUT AURA AND WITHOUT STATUS MIGRAINOSUS, NOT INTRACTABLE: ICD-10-CM

## 2023-01-03 DIAGNOSIS — Z79.899 MEDICATION MANAGEMENT: ICD-10-CM

## 2023-01-03 DIAGNOSIS — Z00.00 HEALTHCARE MAINTENANCE: ICD-10-CM

## 2023-01-03 DIAGNOSIS — M25.50 POLYARTHRALGIA: ICD-10-CM

## 2023-01-03 DIAGNOSIS — R53.83 OTHER FATIGUE: ICD-10-CM

## 2023-01-03 PROCEDURE — 72100 X-RAY EXAM L-S SPINE 2/3 VWS: CPT

## 2023-01-03 PROCEDURE — 99214 OFFICE O/P EST MOD 30 MIN: CPT | Performed by: NURSE PRACTITIONER

## 2023-01-03 RX ORDER — LAMOTRIGINE 25 MG/1
TABLET ORAL
COMMUNITY
Start: 2022-12-09

## 2023-01-03 RX ORDER — DESVENLAFAXINE 100 MG/1
TABLET, EXTENDED RELEASE ORAL
COMMUNITY
Start: 2022-11-26

## 2023-01-03 RX ORDER — LORAZEPAM 0.5 MG/1
0.5 TABLET ORAL EVERY 8 HOURS PRN
COMMUNITY

## 2023-01-03 RX ORDER — BUPROPION HYDROCHLORIDE 150 MG/1
TABLET ORAL
COMMUNITY
Start: 2022-12-03

## 2023-01-03 NOTE — PROGRESS NOTES
Chief Complaint  Anxiety    Subjective        Gorge Barksdale presents to [facility]  History of Present Illness    The patient presents today for followup of anxiety and migraine. She is interested in undergoing laboratory work. She is accompanied by her maternal grandmother.    The patient rates her anxiety currently as  7/10. She states that anxiety, rather than depression, is the primary issue for her currently. She follows up with OCTAVIO Rojas APRN, at [facility]. The patient lives with her boyfriend, which she feels is helpful for her anxiety. Her grandmother notes that she has lost weight recently.    The patient's medications have been changed recently. She is now taking ativan as well as lamictal, pristiq and wellbutrin. She states this is improved but will still have increased anxiety.      The patient is not fasting today. She requests evaluation of her thyroid function and possible autoimmune issues. She denies taking supplements. The patient and her grandmother report a history of lupus, gastroparesis, transverse myelitis, and autoimmune disease in the patient's mother. The patient reports a personal history of gastrointestinal issues as well as a family history of gastrointestinal issues in her 2 brothers. The patient's grandmother reports a history of atrial fibrillation and anxiety in the patient's maternal great-grandmother and alcoholism in the patient's maternal great-grandfather. She denies a known history of autoimmune disorder in other relatives. The patient reports irregular bowel movements. She previously experienced frequent diarrhea, and since the changes to her psychiatric medications, she has experienced frequent constipation. The patient reports anxiety related to medical concerns and medical appointments. She notes polyarthralgia, particularly bilateral knee pain since a young age and bilateral foot pain in cold weather. The  patient denies obvious food triggers for her symptoms.    Her grandmother notes that the patient has undergone dental work recently including 2 root canal procedures.    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 She states something popped in her low back and it radiates into her legs, alternating. She was stretching and felt a pop in her low back recently and it has worsened. She is interested in getting more work up.    Objective   Vital Signs:  /68 (BP Location: Right arm, Patient Position: Sitting, Cuff Size: Adult)   Pulse 113   Temp 97.3 °F (36.3 °C) (Temporal)   Resp 18   Ht 165.1 cm (65\")   Wt 111 kg (243 lb 12.8 oz)   SpO2 98%   BMI 40.57 kg/m²   Estimated body mass index is 40.57 kg/m² as calculated from the following:    Height as of this encounter: 165.1 cm (65\").    Weight as of this encounter: 111 kg (243 lb 12.8 oz).          Physical Exam  Constitutional:       General: She is not in acute distress.     Appearance: Normal appearance. She is well-developed. She is not diaphoretic.   HENT:      Right Ear: Tympanic membrane and ear canal normal.      Left Ear: Tympanic membrane and ear canal normal.      Nose: Nose normal.      Mouth/Throat:      Mouth: Mucous membranes are moist.      Pharynx: Oropharynx is clear.   Eyes:      Conjunctiva/sclera: Conjunctivae normal.      Pupils: Pupils are equal, round, and reactive to light.   Cardiovascular:      Rate and Rhythm: Normal rate and regular rhythm.      Heart sounds: Normal heart sounds. No murmur heard.    No friction rub. No gallop.   Pulmonary:      Effort: Pulmonary effort is normal. No respiratory distress.      Breath sounds: Normal breath sounds. No wheezing or rales.   Abdominal:      General: Bowel sounds are normal. There is no distension.      Palpations: Abdomen is soft.      Tenderness: There is no abdominal tenderness.   Musculoskeletal:         General: Normal  range of motion.      Cervical back: Neck supple.   Lymphadenopathy:      Cervical: No cervical adenopathy.   Skin:     General: Skin is warm and dry.   Neurological:      Mental Status: She is alert and oriented to person, place, and time.   Psychiatric:         Mood and Affect: Mood normal.     ?(PE macro not stated, SESDAX w/o HEENT added, please verify)   Result Review :                  Assessment and Plan   Diagnoses and all orders for this visit:    1. Anxiety (Primary)    2. Migraine without aura and without status migrainosus, not intractable    3. Family history of autoimmune disorder  -     KATHARINA Direct Reflex to 11 Biomarker    4. Polyarthralgia  -     KATHARINA Direct Reflex to 11 Biomarker    5. Medication management  -     CBC & Differential  -     Comprehensive metabolic panel    6. Drug-induced constipation  -     Tissue Transglutaminase, IgA    7. Other fatigue  -     TSH Rfx On Abnormal To Free T4  -     Vitamin D 25 hydroxy  -     Vitamin B12    8. Chronic bilateral low back pain with bilateral sciatica  -     XR Spine Lumbar 2 or 3 View; Future    9. Health maintenance alteration    10. Healthcare maintenance  -     Ambulatory Referral to Gynecology      1. Anxiety  - The patient is taking new medications as discussed. She will follow up with the psychiatry provider as needed with regard to the medications.     2. Migraine  - She was advised to take rizatriptan as needed for severe migraine and ibuprofen or other over-the-counter migraine medication for average migraine.    3. Family history of autoimmune disorder  - KATHARINA test will be performed due to family history of lupus.  - Transglutaminase test will be performed for celiac disease workup.    4. Drug-induced constipation  - She reports new gastrointestinal issues since her psychiatric medications were changed; previously, increased diarrhea and now with more constipation. MiraLAX daily as needed was recommended.  - Gastroenterology followup will be  considered if needed.    5. Polyarthralgia    6. Health maintenance  - COVID-19 vaccination will be provided today if available. Otherwise, she was advised to obtain COVID-19 vaccination at a pharmacy. Influenza vaccination was recommended. She was advised to obtain tetanus vaccination if she suffers a laceration or animal bite.  - Routine laboratory studies will be obtained, including CMP, CBC, thyroid function, vitamin D, and vitamin B12.  - Gynecology referral will be placed.  - The patient will follow up pending the results of her laboratory work.    7. Back pain: will proceed with imaging, pending result, would start PT and consider additional imaging if needed       Follow Up   No follow-ups on file.  Patient was given instructions and counseling regarding her condition or for health maintenance advice. Please see specific information pulled into the AVS if appropriate.       Transcribed from ambient dictation for YAZMIN Figueroa by Shala Tran.  01/03/23   15:33 EST    Patient or patient representative verbalized consent to the visit recording.  I have personally performed the services described in this document as transcribed by the above individual, and it is both accurate and complete.

## 2023-01-04 LAB
25(OH)D3+25(OH)D2 SERPL-MCNC: 15.1 NG/ML (ref 30–100)
ALBUMIN SERPL-MCNC: 4.7 G/DL (ref 3.5–5.2)
ALBUMIN/GLOB SERPL: 2 G/DL
ALP SERPL-CCNC: 70 U/L (ref 39–117)
ALT SERPL-CCNC: 11 U/L (ref 1–33)
ANA SER QL: NEGATIVE
AST SERPL-CCNC: 14 U/L (ref 1–32)
BASOPHILS # BLD AUTO: 0.01 10*3/MM3 (ref 0–0.2)
BASOPHILS NFR BLD AUTO: 0.2 % (ref 0–1.5)
BILIRUB SERPL-MCNC: 0.2 MG/DL (ref 0–1.2)
BUN SERPL-MCNC: 11 MG/DL (ref 6–20)
BUN/CREAT SERPL: 12.1 (ref 7–25)
CALCIUM SERPL-MCNC: 9.2 MG/DL (ref 8.6–10.5)
CHLORIDE SERPL-SCNC: 107 MMOL/L (ref 98–107)
CO2 SERPL-SCNC: 19 MMOL/L (ref 22–29)
CREAT SERPL-MCNC: 0.91 MG/DL (ref 0.57–1)
EGFRCR SERPLBLD CKD-EPI 2021: 91.1 ML/MIN/1.73
EOSINOPHIL # BLD AUTO: 0 10*3/MM3 (ref 0–0.4)
EOSINOPHIL NFR BLD AUTO: 0 % (ref 0.3–6.2)
ERYTHROCYTE [DISTWIDTH] IN BLOOD BY AUTOMATED COUNT: 12.2 % (ref 12.3–15.4)
GLOBULIN SER CALC-MCNC: 2.3 GM/DL
GLUCOSE SERPL-MCNC: 95 MG/DL (ref 65–99)
HCT VFR BLD AUTO: 41.6 % (ref 34–46.6)
HGB BLD-MCNC: 14.1 G/DL (ref 12–15.9)
IMM GRANULOCYTES # BLD AUTO: 0.01 10*3/MM3 (ref 0–0.05)
IMM GRANULOCYTES NFR BLD AUTO: 0.2 % (ref 0–0.5)
LYMPHOCYTES # BLD AUTO: 1.2 10*3/MM3 (ref 0.7–3.1)
LYMPHOCYTES NFR BLD AUTO: 20.3 % (ref 19.6–45.3)
MCH RBC QN AUTO: 30.6 PG (ref 26.6–33)
MCHC RBC AUTO-ENTMCNC: 33.9 G/DL (ref 31.5–35.7)
MCV RBC AUTO: 90.2 FL (ref 79–97)
MONOCYTES # BLD AUTO: 0.57 10*3/MM3 (ref 0.1–0.9)
MONOCYTES NFR BLD AUTO: 9.6 % (ref 5–12)
NEUTROPHILS # BLD AUTO: 4.12 10*3/MM3 (ref 1.7–7)
NEUTROPHILS NFR BLD AUTO: 69.7 % (ref 42.7–76)
NRBC BLD AUTO-RTO: 0 /100 WBC (ref 0–0.2)
PLATELET # BLD AUTO: 280 10*3/MM3 (ref 140–450)
POTASSIUM SERPL-SCNC: 3.6 MMOL/L (ref 3.5–5.2)
PROT SERPL-MCNC: 7 G/DL (ref 6–8.5)
RBC # BLD AUTO: 4.61 10*6/MM3 (ref 3.77–5.28)
SODIUM SERPL-SCNC: 137 MMOL/L (ref 136–145)
TSH SERPL DL<=0.005 MIU/L-ACNC: 1.99 UIU/ML (ref 0.27–4.2)
TTG IGA SER-ACNC: <2 U/ML (ref 0–3)
VIT B12 SERPL-MCNC: 236 PG/ML (ref 211–946)
WBC # BLD AUTO: 5.91 10*3/MM3 (ref 3.4–10.8)

## 2023-04-21 NOTE — TELEPHONE ENCOUNTER
Rx Refill Note  Requested Prescriptions     Pending Prescriptions Disp Refills   • Jencycla 0.35 MG tablet [Pharmacy Med Name: JENCYCLA 0.35 MG TABLET] 28 tablet 12     Sig: TAKE ONE TABLET BY MOUTH DAILY   • topiramate (TOPAMAX) 100 MG tablet [Pharmacy Med Name: TOPIRAMATE 100 MG TABLET] 60 tablet      Sig: TAKE ONE TABLET BY MOUTH TWICE A DAY      Last office visit with prescribing clinician: 1/3/2023   Last telemedicine visit with prescribing clinician: Visit date not found   Next office visit with prescribing clinician: Visit date not found       {TIP  Please add Last Relevant Lab Date if appropriate: 01/03/23                 Would you like a call back once the refill request has been completed: [] Yes [] No    If the office needs to give you a call back, can they leave a voicemail: [] Yes [] No    Gayle Estes MA  04/21/23, 10:15 EDT

## 2023-04-25 RX ORDER — NORETHINDRONE 0.35 MG/1
TABLET ORAL
Qty: 28 TABLET | Refills: 12 | Status: SHIPPED | OUTPATIENT
Start: 2023-04-25

## 2023-04-25 RX ORDER — TOPIRAMATE 100 MG/1
TABLET, FILM COATED ORAL
Qty: 60 TABLET | Refills: 5 | Status: SHIPPED | OUTPATIENT
Start: 2023-04-25

## 2023-09-06 ENCOUNTER — OFFICE VISIT (OUTPATIENT)
Dept: OBSTETRICS AND GYNECOLOGY | Age: 23
End: 2023-09-06
Payer: COMMERCIAL

## 2023-09-06 VITALS
HEIGHT: 65 IN | SYSTOLIC BLOOD PRESSURE: 126 MMHG | DIASTOLIC BLOOD PRESSURE: 76 MMHG | BODY MASS INDEX: 42.32 KG/M2 | WEIGHT: 254 LBS

## 2023-09-06 DIAGNOSIS — Z11.3 SCREEN FOR STD (SEXUALLY TRANSMITTED DISEASE): ICD-10-CM

## 2023-09-06 DIAGNOSIS — Z01.419 WELL WOMAN EXAM WITH ROUTINE GYNECOLOGICAL EXAM: ICD-10-CM

## 2023-09-06 DIAGNOSIS — N94.6 DYSMENORRHEA: ICD-10-CM

## 2023-09-06 DIAGNOSIS — Z12.4 ENCOUNTER FOR PAPANICOLAOU SMEAR FOR CERVICAL CANCER SCREENING: Primary | ICD-10-CM

## 2023-09-06 LAB
BILIRUB BLD-MCNC: NEGATIVE MG/DL
CLARITY, POC: CLEAR
COLOR UR: YELLOW
GLUCOSE UR STRIP-MCNC: NEGATIVE MG/DL
KETONES UR QL: NEGATIVE
LEUKOCYTE EST, POC: NEGATIVE
NITRITE UR-MCNC: NEGATIVE MG/ML
PH UR: 7 [PH] (ref 5–8)
PROT UR STRIP-MCNC: NEGATIVE MG/DL
RBC # UR STRIP: NEGATIVE /UL
SP GR UR: 1.02 (ref 1–1.03)
UROBILINOGEN UR QL: NORMAL

## 2023-09-06 RX ORDER — DROSPIRENONE AND ESTETROL 3-14.2(28)
1 KIT ORAL DAILY
Qty: 30 TABLET | Refills: 0 | COMMUNITY
Start: 2023-09-06

## 2023-09-06 RX ORDER — MEFENAMIC ACID 250 MG/1
CAPSULE ORAL
Qty: 30 EACH | Refills: 3 | Status: SHIPPED | OUTPATIENT
Start: 2023-09-06

## 2023-09-06 NOTE — PROGRESS NOTES
"Subjective     Chief Complaint   Patient presents with    Gynecologic Exam     New pt annual exam  c/o lots of cramping before her period and during.  C/o lower abdominal cramping occ with bending sometimes.       History of Present Illness    Gorge Barskdale is a 23 y.o.  who presents for annual exam.    She is accompanied by her Mom, Crystal and GM Galo Hodge for t hem to be present    Ha never been to gyn before  Referred by pcp    Notes painful IC  Has always been uncomfortable  Same partner for 3 years    Also has \"cuts on her vagina\"  Worse when she is SA  No oozing    Has painful ovulation, worse on the left side  Can radiate to her back   Feels like burning and pressure     Onset of most of her sx's the past couple of years but notes long h/o period issues  Would skip periods and then passed a large blood clot after absence of a period for 3 months    Menses are more regular on BCP but still with some irregularity  She is on POP    When she started her periods at 9 would have irregular periods  Would skip and then have heavy periods  Remembers at 11 yoa she would bleed through everything    Started her bcp 2-3 ya  Does help with her bleeding but the pain has started more recently  Notes hair growth in unusual places, has more depression prior to the onset of her cycle    Uses MJ to treat her pain    Family h/o hyst at 31 yoa in mom and GM-mom had ovarian cysts that were \"huge\" and endometriosis. GM had endometriosis     Sees Barrie Salazar, psych NP, PTSD, anxiety and depression  Migraines, denies vascular migraines, does plan to see neurologist-has TBI r/t fall from shopping cart at 1 yoa  H/o urinary frequency, w/u by urologist, stretched bladder. Seemed to help for a little while  Denies bowel issues    Her menses are irregular, lasting 4-7 days, dysmenorrhea moderate, occurring  during ovulation    Obstetric History:  OB History          0    Para   0    Term   0       0    AB   0    Living " "  0         SAB   0    IAB   0    Ectopic   0    Molar   0    Multiple   0    Live Births   0               Menstrual History:     Patient's last menstrual period was 08/28/2023 (approximate).         Current contraception: oral progesterone-only contraceptive  History of abnormal Pap smear:  never had  Received Gardasil immunization: yes  Perform regular self breast exam: no  Family history of uterine or ovarian cancer: yes - PGM, unsure genetic testing status  Family History of colon cancer: no  Family history of breast cancer: no    Mammogram: not indicated.  Colonoscopy: not indicated.  DEXA: not indicated.    Exercise: not active  Calcium/Vitamin D: adequate intake    The following portions of the patient's history were reviewed and updated as appropriate: allergies, current medications, past family history, past medical history, past social history, past surgical history, and problem list.    Review of Systems   Genitourinary:  Positive for dyspareunia.        Urinary frequency   All other systems reviewed and are negative.    Review of Systems   Constitutional: Negative for fatigue.   Respiratory: Negative for shortness of breath.    Gastrointestinal: Negative for abdominal pain.   Genitourinary: Negative for dysuria.   Neurological: Negative for headaches.   Psychiatric/Behavioral: Negative for dysphoric mood.         Objective   Physical Exam    /76   Ht 165.1 cm (65\")   Wt 115 kg (254 lb)   LMP 08/28/2023 (Approximate)   BMI 42.27 kg/m²   General:   alert, comfortable, and no distress   Heart: regular rate and rhythm   Lungs: clear to auscultation bilaterally   Breast: normal appearance, no masses or tenderness, Inspection negative, No nipple retraction or dimpling, No nipple discharge or bleeding, No axillary or supraclavicular adenopathy, Normal to palpation without dominant masses, Taught monthly breast self examination   Neck: no adenopathy and no carotid bruit   Abdomen: normal findings: " soft, non-tender   CVA: Not performed today   Pelvis: External genitalia: normal general appearance  Vaginal: normal mucosa without prolapse or lesions  Cervix: normal appearance, thin prep PAP obtained, and GC prep obtained  Adnexa: normal bimanual exam  Uterus: normal single, nontender  Exam limited by body habitus   Extremities: Not performed today   Neurologic: negative   Psychiatric: Normal affect, judgement, and mood     Assessment & Plan   Diagnoses and all orders for this visit:    1. Encounter for Papanicolaou smear for cervical cancer screening (Primary)  -     IGP, CtNg, Rfx Aptima HPV ASCU    2. Well woman exam with routine gynecological exam    3. Screen for STD (sexually transmitted disease)  -     IGP, CtNg, Rfx Aptima HPV ASCU    4. Dysmenorrhea  -     Mefenamic Acid 250 MG capsule; 2 po at onset of pain and 1 po q 6 hours prn pain  Dispense: 30 each; Refill: 3    Other orders  -     Drospirenone-Estetrol (Nextstellis) 3-14.2 MG tablet; Take 1 tablet by mouth Daily.  Dispense: 30 tablet; Refill: 0        All questions answered.  Breast self exam technique reviewed and patient encouraged to perform self-exam monthly.  Discussed healthy lifestyle modifications.  Recommended 30 minutes of aerobic exercise five times per week.  Discussed calcium needs to prevent osteoporosis.    Pap and std testing done  Try MELYSSA, sample of nextsellis given  Planned for urine culture but pt left without leaving sample, can get at next appt  Start ponstel prn pelvic pain, would enc pt to avoid marijuana for pain relief especially in the setting of anxiety. Can worsen her sxs'  Disc genetic testing and HO given, will d/w Dad and his family  Disc PT for pelvic pain, overall tolerated the speculum exam well, but could use further w/u in PT setting. She will consider

## 2023-09-08 LAB
BACTERIA UR CULT: NORMAL
BACTERIA UR CULT: NORMAL

## 2023-09-14 LAB
C TRACH RRNA CVX QL NAA+PROBE: NEGATIVE
CONV .: ABNORMAL
CYTOLOGIST CVX/VAG CYTO: ABNORMAL
CYTOLOGY CVX/VAG DOC CYTO: ABNORMAL
CYTOLOGY CVX/VAG DOC THIN PREP: ABNORMAL
DX ICD CODE: ABNORMAL
DX ICD CODE: ABNORMAL
HIV 1 & 2 AB SER-IMP: ABNORMAL
HPV I/H RISK 4 DNA CVX QL PROBE+SIG AMP: NEGATIVE
N GONORRHOEA RRNA CVX QL NAA+PROBE: NEGATIVE
OTHER STN SPEC: ABNORMAL
PATHOLOGIST CVX/VAG CYTO: ABNORMAL
STAT OF ADQ CVX/VAG CYTO-IMP: ABNORMAL

## 2023-10-03 ENCOUNTER — OFFICE VISIT (OUTPATIENT)
Dept: OBSTETRICS AND GYNECOLOGY | Age: 23
End: 2023-10-03
Payer: COMMERCIAL

## 2023-10-03 VITALS
DIASTOLIC BLOOD PRESSURE: 70 MMHG | WEIGHT: 253 LBS | SYSTOLIC BLOOD PRESSURE: 100 MMHG | HEIGHT: 65 IN | BODY MASS INDEX: 42.15 KG/M2

## 2023-10-03 DIAGNOSIS — N94.10 FEMALE DYSPAREUNIA: Primary | ICD-10-CM

## 2023-10-03 RX ORDER — DROSPIRENONE AND ESTETROL 3-14.2(28)
1 KIT ORAL DAILY
Qty: 90 TABLET | Refills: 3 | Status: SHIPPED | OUTPATIENT
Start: 2023-10-03

## 2023-10-03 NOTE — PROGRESS NOTES
"Subjective     Chief Complaint   Patient presents with    Pelvic Pain     Follow up from 2023 for ultrasound, evaluate pelvic pain.          Gorge Barksdale is a 23 y.o.  whose LMP is Patient's last menstrual period was 2023 (exact date). Presents for f/u u/s    I saw Gorge on the  for her first visit here  She noted pain with IC  Has fhx of endometriosis  We performed her annual exam and urine culture was done  It was negative for infection  Pap was done with std testing and it was essentially normal-ASCUS no std's    Here for u/s today    No Additional Complaints Reported    The following portions of the patient's history were reviewed and updated as appropriate:no additional history reviewed, vital signs, allergies, current medications, past medical history, past social history, past surgical history, and problem list      Review of Systems   Genitourinary:positive for dyspareunia     Objective      /70 (BP Location: Right arm, Patient Position: Sitting, Cuff Size: Large Adult)   Ht 165.1 cm (65\")   Wt 115 kg (253 lb)   LMP 2023 (Exact Date)   BMI 42.10 kg/m²     Physical Exam    General:   alert, comfortable, and no distress   Heart: Not performed today   Lungs: Not performed today.   Breast: Not performed today   Neck: Not performed today   Abdomen: Not performed today   CVA: Not performed today   Pelvis: Not performed today   Extremities: Not performed today   Neurologic: negative   Psychiatric: Normal affect, judgement, and mood       Lab Review   Labs: Urine culture, colony count, sensitivity    Imaging   Ultrasound - Pelvic Vaginal  1.  Uterus: Normal size, with uterine volume of 42 ml, and with uterine dimensions 6 x 4 x 3 cm     2.  Endometrium: Normal non-menopausal thickness and 3.8 mm      3.  Myometrium: Heterogenous texture      4.  Ovaries             Left: Normal/unremarkable  and Normal small follicles             Right: Normal/unremarkable  and " Normal small follicles     Assessment & Plan     ASSESSMENT  1. Female dyspareunia          PLAN  1. U/s is wnl and reassuring. Disc limitations of diagnosing endometriosis via u/s. She feels some improvement with nexstellis and would like an rx for this.  C/w bcp and can take continuously if desires. Call for any issues    2. Medications prescribed this encounter:        New Medications Ordered This Visit   Medications    Drospirenone-Estetrol (Nextstellis) 3-14.2 MG tablet     Sig: Take 1 tablet by mouth Daily.     Dispense:  90 tablet     Refill:  3     Order Specific Question:   Quantity     Answer:   3           Follow up: 1 year(s)    KAY Paul  10/3/2023

## 2023-11-01 RX ORDER — TOPIRAMATE 100 MG/1
100 TABLET, FILM COATED ORAL 2 TIMES DAILY
Qty: 60 TABLET | Refills: 0 | Status: SHIPPED | OUTPATIENT
Start: 2023-11-01

## 2023-12-01 RX ORDER — TOPIRAMATE 100 MG/1
100 TABLET, FILM COATED ORAL 2 TIMES DAILY
Qty: 60 TABLET | Refills: 0 | Status: SHIPPED | OUTPATIENT
Start: 2023-12-01

## 2024-01-03 RX ORDER — TOPIRAMATE 100 MG/1
100 TABLET, FILM COATED ORAL 2 TIMES DAILY
Qty: 60 TABLET | Refills: 0 | Status: SHIPPED | OUTPATIENT
Start: 2024-01-03

## 2024-01-03 NOTE — TELEPHONE ENCOUNTER
Rx Refill Note  Requested Prescriptions     Pending Prescriptions Disp Refills    topiramate (TOPAMAX) 100 MG tablet [Pharmacy Med Name: Topiramate Oral Tablet 100 MG] 60 tablet 0     Sig: TAKE 1 TABLET BY MOUTH 2 TIMES A DAY      Last office visit with prescribing clinician: 1/3/2023   Last telemedicine visit with prescribing clinician: Visit date not found   Next office visit with prescribing clinician: Visit date not found       {TIP  Please add Last Relevant Lab Date if appropriate: 09/06/23                 Would you like a call back once the refill request has been completed: [] Yes [] No    If the office needs to give you a call back, can they leave a voicemail: [] Yes [] No    Gayle Estes MA  01/03/24, 11:50 EST

## 2024-01-18 ENCOUNTER — TELEPHONE (OUTPATIENT)
Dept: OBSTETRICS AND GYNECOLOGY | Age: 24
End: 2024-01-18
Payer: COMMERCIAL

## 2024-01-18 NOTE — TELEPHONE ENCOUNTER
Patient stated her insurance is not covering Nextstellis and would like some samples if available.

## 2024-01-31 ENCOUNTER — OFFICE VISIT (OUTPATIENT)
Dept: FAMILY MEDICINE CLINIC | Facility: CLINIC | Age: 24
End: 2024-01-31
Payer: COMMERCIAL

## 2024-01-31 VITALS
BODY MASS INDEX: 42.63 KG/M2 | HEART RATE: 90 BPM | DIASTOLIC BLOOD PRESSURE: 72 MMHG | HEIGHT: 65 IN | SYSTOLIC BLOOD PRESSURE: 118 MMHG | TEMPERATURE: 97.5 F | WEIGHT: 255.9 LBS | RESPIRATION RATE: 18 BRPM | OXYGEN SATURATION: 99 %

## 2024-01-31 DIAGNOSIS — F41.9 ANXIETY: ICD-10-CM

## 2024-01-31 DIAGNOSIS — E55.9 VITAMIN D DEFICIENCY: ICD-10-CM

## 2024-01-31 DIAGNOSIS — Z00.00 ANNUAL PHYSICAL EXAM: Primary | ICD-10-CM

## 2024-01-31 DIAGNOSIS — E53.8 VITAMIN B12 DEFICIENCY: ICD-10-CM

## 2024-01-31 DIAGNOSIS — G43.009 MIGRAINE WITHOUT AURA AND WITHOUT STATUS MIGRAINOSUS, NOT INTRACTABLE: ICD-10-CM

## 2024-01-31 RX ORDER — RIZATRIPTAN BENZOATE 10 MG/1
10 TABLET ORAL ONCE
Qty: 10 TABLET | Refills: 4 | Status: SHIPPED | OUTPATIENT
Start: 2024-01-31 | End: 2024-01-31

## 2024-01-31 RX ORDER — TOPIRAMATE 100 MG/1
100 TABLET, FILM COATED ORAL 2 TIMES DAILY
Qty: 60 TABLET | Refills: 0 | OUTPATIENT
Start: 2024-01-31

## 2024-01-31 RX ORDER — TOPIRAMATE 100 MG/1
100 TABLET, FILM COATED ORAL 2 TIMES DAILY
Qty: 180 TABLET | Refills: 3 | Status: SHIPPED | OUTPATIENT
Start: 2024-01-31

## 2024-01-31 NOTE — PROGRESS NOTES
"Chief Complaint  Annual Exam    Subjective        Gorge Barksdale presents to [facility]  History of Present Illness    Gorge Barksdale is a 23-year-old female who presents today for an annual examination. An adult male accompanies her.     The patient reports she is doing well other than her life has been stressful. She notes the Topamax is working well for her. She states she will get occasional headaches. She believes her migraines are triggered by stress or if she has an alcoholic beverage.  She is requesting a prescription refill for Topamax. She takes Maxalt when her migraines are really bad. She took Maxalt on one occasion and it made her feel \"droopy\" however, she notes that it does help.     She is no longer taking Pristiq. She realized when she stopped Pristiq that she was having suicidal thoughts while taking it. She has not been taking hydroxyzine for her anxiety. She has not been taking anything for her anxiety. She states she did not mean to discontinue all her medications. She feels like she does not suffer from depression as much as she suffers from anxiety. She is experiencing a lot of drama. She is still dealing with medical \"stuff\" and feels like she is constantly thinking about things going on in her body. She sometimes feels like she is agoraphobic and has difficulty going to the store. She has not gotten a job because of that, and it weighs on her, so she stays at home. She thinks she was better on Zoloft, but thinks she got \"too high\" on it.    She declines a tetanus vaccine today.    She voices concern about her last Pap smear she did in 10/2023 which was abnormal. Her gynecologist did not answer all her questions. She was told to come back in 1 year. She tried to ask her gynecologist about these \"cuts\" that she gets frequently near her vagina, but she feels like she was ignored.     She mentions that she feels like she always has a pain \"running down here\" and is " "not sure if it is real, or just her anxiety.      She is not good at taking her vitamins regularly.      Objective   Vital Signs:  /72 (BP Location: Left arm, Patient Position: Sitting, Cuff Size: Adult)   Pulse 90   Temp 97.5 °F (36.4 °C) (Temporal)   Resp 18   Ht 165.1 cm (65\")   Wt 116 kg (255 lb 14.4 oz)   SpO2 99%   BMI 42.58 kg/m²   Estimated body mass index is 42.58 kg/m² as calculated from the following:    Height as of this encounter: 165.1 cm (65\").    Weight as of this encounter: 116 kg (255 lb 14.4 oz).          Physical Exam  Constitutional:       General: She is not in acute distress.     Appearance: She is well-developed. She is not diaphoretic.   Cardiovascular:      Rate and Rhythm: Normal rate and regular rhythm.      Heart sounds: Normal heart sounds. No murmur heard.     No friction rub. No gallop.   Pulmonary:      Effort: Pulmonary effort is normal. No respiratory distress.      Breath sounds: Normal breath sounds. No wheezing or rales.   Abdominal:      General: Bowel sounds are normal. There is no distension.      Palpations: Abdomen is soft.      Tenderness: There is no abdominal tenderness.   Musculoskeletal:      Cervical back: Neck supple.   Skin:     General: Skin is warm and dry.   Neurological:      Mental Status: She is alert and oriented to person, place, and time.          Result Review :    Lab work completed in 09/2023 showed HPV DNA probe was negative.     Lab work completed in 01/2023 was reviewed. KATHARINA was normal. Vitamin B12 was low, as well as vitamin D25 hydroxy.                   Assessment and Plan   Diagnoses and all orders for this visit:    1. Annual physical exam (Primary)  -     CBC & Differential  -     Comprehensive Metabolic Panel  -     Lipid Panel With LDL / HDL Ratio  -     TSH Rfx On Abnormal To Free T4    2. Anxiety    3. Migraine without aura and without status migrainosus, not intractable    4. Vitamin B12 deficiency  -     Vitamin B12    5. " Vitamin D deficiency  -     Vitamin D 25 hydroxy    Other orders  -     topiramate (TOPAMAX) 100 MG tablet; Take 1 tablet by mouth 2 (Two) Times a Day.  Dispense: 180 tablet; Refill: 3  -     rizatriptan (MAXALT) 10 MG tablet; Take 1 tablet by mouth 1 (One) Time for 1 dose. AT ONSET OF HEADACHE MAY REPEAT ONE TABLET IN 2 HOURS IF NEEDED  Dispense: 10 tablet; Refill: 4  -     sertraline (Zoloft) 50 MG tablet; Take 1 tablet by mouth Daily.  Dispense: 30 tablet; Refill: 5        1. Annual physical exam (Primary)  She declines a tetanus vaccine today. She had an abnormal Pap smear in 10/2023 and will follow up with her gynecologist. She will complete lab work to include CBC, CMP, vitamin B12, vitamin D 25 hydroxy, lipid panel, and TSH.     2. Migraine without aura and without status migrainosus, not intractable  - A prescription refill for Topamax has been sent.   - A prescription for Maxalt to be taken at the onset of a migraine has been sent.     3. Anxiety  - She will resume Zoloft at a low dose. A prescription for sertraline (ZOLOFT) 50 mg has been sent.     4. Vitamin B12 deficiency  - Lab work completed in 01/2023 showed vitamin B12 decreased.   - She will complete lab work to include vitamin B12.    5. Vitamin D deficiency  - Lab work from 01/2023 showed vitamin D decreased.   - She will complete lab work to include vitamin D 25 hydroxy.    Patient will follow up in 1 year or sooner if needed.       Follow Up   No follow-ups on file.  Patient was given instructions and counseling regarding her condition or for health maintenance advice. Please see specific information pulled into the AVS if appropriate.       Transcribed from ambient dictation for YAZMIN Figueroa by Fabby Payne.  01/31/24   17:50 EST    Patient or patient representative verbalized consent to the visit recording.  I have personally performed the services described in this document as transcribed by the above individual, and it is both  accurate and complete.

## 2024-02-01 LAB
25(OH)D3+25(OH)D2 SERPL-MCNC: 33 NG/ML (ref 30–100)
ALBUMIN SERPL-MCNC: 4.6 G/DL (ref 3.5–5.2)
ALBUMIN/GLOB SERPL: 1.6 G/DL
ALP SERPL-CCNC: 62 U/L (ref 39–117)
ALT SERPL-CCNC: 14 U/L (ref 1–33)
AST SERPL-CCNC: 13 U/L (ref 1–32)
BASOPHILS # BLD AUTO: 0.04 10*3/MM3 (ref 0–0.2)
BASOPHILS NFR BLD AUTO: 0.5 % (ref 0–1.5)
BILIRUB SERPL-MCNC: 0.3 MG/DL (ref 0–1.2)
BUN SERPL-MCNC: 11 MG/DL (ref 6–20)
BUN/CREAT SERPL: 9.1 (ref 7–25)
CALCIUM SERPL-MCNC: 9.4 MG/DL (ref 8.6–10.5)
CHLORIDE SERPL-SCNC: 108 MMOL/L (ref 98–107)
CHOLEST SERPL-MCNC: 146 MG/DL (ref 0–200)
CO2 SERPL-SCNC: 20.4 MMOL/L (ref 22–29)
CREAT SERPL-MCNC: 1.21 MG/DL (ref 0.57–1)
EGFRCR SERPLBLD CKD-EPI 2021: 64.3 ML/MIN/1.73
EOSINOPHIL # BLD AUTO: 0.41 10*3/MM3 (ref 0–0.4)
EOSINOPHIL NFR BLD AUTO: 5.4 % (ref 0.3–6.2)
ERYTHROCYTE [DISTWIDTH] IN BLOOD BY AUTOMATED COUNT: 12 % (ref 12.3–15.4)
GLOBULIN SER CALC-MCNC: 2.8 GM/DL
GLUCOSE SERPL-MCNC: 85 MG/DL (ref 65–99)
HCT VFR BLD AUTO: 42.9 % (ref 34–46.6)
HDLC SERPL-MCNC: 35 MG/DL (ref 40–60)
HGB BLD-MCNC: 14.8 G/DL (ref 12–15.9)
IMM GRANULOCYTES # BLD AUTO: 0.01 10*3/MM3 (ref 0–0.05)
IMM GRANULOCYTES NFR BLD AUTO: 0.1 % (ref 0–0.5)
LDLC SERPL CALC-MCNC: 84 MG/DL (ref 0–100)
LDLC/HDLC SERPL: 2.31 {RATIO}
LYMPHOCYTES # BLD AUTO: 2.22 10*3/MM3 (ref 0.7–3.1)
LYMPHOCYTES NFR BLD AUTO: 29 % (ref 19.6–45.3)
MCH RBC QN AUTO: 30.7 PG (ref 26.6–33)
MCHC RBC AUTO-ENTMCNC: 34.5 G/DL (ref 31.5–35.7)
MCV RBC AUTO: 89 FL (ref 79–97)
MONOCYTES # BLD AUTO: 0.64 10*3/MM3 (ref 0.1–0.9)
MONOCYTES NFR BLD AUTO: 8.4 % (ref 5–12)
NEUTROPHILS # BLD AUTO: 4.34 10*3/MM3 (ref 1.7–7)
NEUTROPHILS NFR BLD AUTO: 56.6 % (ref 42.7–76)
NRBC BLD AUTO-RTO: 0 /100 WBC (ref 0–0.2)
PLATELET # BLD AUTO: 354 10*3/MM3 (ref 140–450)
POTASSIUM SERPL-SCNC: 3.5 MMOL/L (ref 3.5–5.2)
PROT SERPL-MCNC: 7.4 G/DL (ref 6–8.5)
RBC # BLD AUTO: 4.82 10*6/MM3 (ref 3.77–5.28)
SODIUM SERPL-SCNC: 138 MMOL/L (ref 136–145)
TRIGL SERPL-MCNC: 151 MG/DL (ref 0–150)
TSH SERPL DL<=0.005 MIU/L-ACNC: 2.01 UIU/ML (ref 0.27–4.2)
VIT B12 SERPL-MCNC: 1537 PG/ML (ref 211–946)
VLDLC SERPL CALC-MCNC: 27 MG/DL (ref 5–40)
WBC # BLD AUTO: 7.66 10*3/MM3 (ref 3.4–10.8)

## 2024-02-06 ENCOUNTER — TELEPHONE (OUTPATIENT)
Dept: OBSTETRICS AND GYNECOLOGY | Age: 24
End: 2024-02-06
Payer: COMMERCIAL

## 2024-02-06 NOTE — TELEPHONE ENCOUNTER
Pt calling stating that she started menses 1 week ago at the same time that she started Nexstellis. She is c/o heavy bleeding and cramping and she passed something but it did not look like a normal clot. Pt is stating that her menses are not usually this long and she is not sure if it has something to do with the BCP or not. Pt would like to know what you think

## 2024-02-07 RX ORDER — ONDANSETRON 4 MG/1
4 TABLET, FILM COATED ORAL EVERY 8 HOURS PRN
Qty: 30 TABLET | Refills: 1 | Status: SHIPPED | OUTPATIENT
Start: 2024-02-07

## 2024-02-13 RX ORDER — ESCITALOPRAM OXALATE 10 MG/1
10 TABLET ORAL DAILY
Qty: 30 TABLET | Refills: 2 | Status: SHIPPED | OUTPATIENT
Start: 2024-02-13

## 2024-02-27 ENCOUNTER — OFFICE VISIT (OUTPATIENT)
Dept: OBSTETRICS AND GYNECOLOGY | Age: 24
End: 2024-02-27
Payer: COMMERCIAL

## 2024-02-27 VITALS
HEIGHT: 65 IN | WEIGHT: 249 LBS | BODY MASS INDEX: 41.48 KG/M2 | SYSTOLIC BLOOD PRESSURE: 128 MMHG | DIASTOLIC BLOOD PRESSURE: 76 MMHG

## 2024-02-27 DIAGNOSIS — N92.6 IRREGULAR MENSES: Primary | ICD-10-CM

## 2024-02-27 LAB
B-HCG UR QL: NEGATIVE
EXPIRATION DATE: NORMAL
INTERNAL NEGATIVE CONTROL: NORMAL
INTERNAL POSITIVE CONTROL: NORMAL
Lab: NORMAL

## 2024-02-27 NOTE — PROGRESS NOTES
"Subjective     Chief Complaint   Patient presents with    Menstrual Problem     C/o irregular bleeding, ultrasound done today.        Gorge Barksdale is a 23 y.o.  whose LMP is Patient's last menstrual period was 2024 (approximate). presents with irregular menses    She passed a \"huge blood clot\" after r/s her BCP  This was near the end of the pack when she was expected to start her period  She then had bleeding/spotting/brown d/c off and on for a month  Then had a second period and her bleeding stopped    She had been off the pill for approximately 2 months  Was SA during that time and did not use bc    Did do a pregnancy test that was negative    Notes a lot of stress recently  Had to find a new psychiatrist and was off her psych meds for a little while    She is accompanied by her BF  Ok for him to be present      No Additional Complaints Reported    The following portions of the patient's history were reviewed and updated as appropriate:no additional history reviewed, vital signs, allergies, current medications, past medical history, past social history, past surgical history, and problem list      Review of Systems   Genitourinary:positive for abnormal menstrual periods     Objective      /76   Ht 165.1 cm (65\")   Wt 113 kg (249 lb)   LMP 2024 (Approximate)   BMI 41.44 kg/m²     Physical Exam    General:   alert, comfortable, and no distress   Heart: Not performed today   Lungs: Not performed today.   Breast: Not performed today   Neck: Not performed today   Abdomen: Not performed today   CVA: Not performed today   Pelvis: Not performed today   Extremities: Not performed today   Neurologic: negative   Psychiatric: Normal affect, judgement, and mood       Lab Review   Labs: Urine pregnancy test    Imaging   Ultrasound - Pelvic Vaginal  1.  Uterus: Normal size     2.  Endometrium: Normal non-menopausal thickness and 8 mm      3.  Myometrium: Normal homogenous texture      4.  Ovaries    "          Left: Normal/unremarkable              Right: Normal/unremarkable  Assessment & Plan     ASSESSMENT  1. Irregular menses          PLAN  1.   Orders Placed This Encounter   Procedures    POC Pregnancy, Urine       2. UPT negative.  She is happy with her pills so plan to keep taking them. Can try to send them to [facility] pharmacy. Suspect her irregular bleeding was r/t her r/s her pill.       Follow up: KAY Serna  2/27/2024

## 2024-03-14 ENCOUNTER — OFFICE VISIT (OUTPATIENT)
Dept: FAMILY MEDICINE CLINIC | Facility: CLINIC | Age: 24
End: 2024-03-14
Payer: COMMERCIAL

## 2024-03-14 VITALS
DIASTOLIC BLOOD PRESSURE: 72 MMHG | RESPIRATION RATE: 18 BRPM | BODY MASS INDEX: 41.77 KG/M2 | HEIGHT: 65 IN | TEMPERATURE: 97.7 F | HEART RATE: 99 BPM | SYSTOLIC BLOOD PRESSURE: 122 MMHG | OXYGEN SATURATION: 98 % | WEIGHT: 250.7 LBS

## 2024-03-14 DIAGNOSIS — R35.0 URINARY FREQUENCY: ICD-10-CM

## 2024-03-14 DIAGNOSIS — R25.2 LEG CRAMPING: Primary | ICD-10-CM

## 2024-03-14 LAB
BILIRUB BLD-MCNC: NEGATIVE MG/DL
CLARITY, POC: CLEAR
COLOR UR: YELLOW
EXPIRATION DATE: NORMAL
GLUCOSE UR STRIP-MCNC: NEGATIVE MG/DL
KETONES UR QL: NEGATIVE
LEUKOCYTE EST, POC: NEGATIVE
Lab: NORMAL
NITRITE UR-MCNC: NEGATIVE MG/ML
PH UR: 6.5 [PH] (ref 5–8)
PROT UR STRIP-MCNC: NEGATIVE MG/DL
RBC # UR STRIP: NEGATIVE /UL
SP GR UR: 1.02 (ref 1–1.03)
UROBILINOGEN UR QL: NORMAL

## 2024-03-14 PROCEDURE — 81003 URINALYSIS AUTO W/O SCOPE: CPT | Performed by: NURSE PRACTITIONER

## 2024-03-14 PROCEDURE — 99213 OFFICE O/P EST LOW 20 MIN: CPT | Performed by: NURSE PRACTITIONER

## 2024-03-14 RX ORDER — HYDROXYZINE HYDROCHLORIDE 10 MG/1
TABLET, FILM COATED ORAL
COMMUNITY
Start: 2024-03-08

## 2024-03-14 NOTE — PROGRESS NOTES
"Chief Complaint  Muscle Pain (Legs cramp for a month, rt has been both, /Has been when she walks. )    Subjective        Gorge Barksdale presents to [facility]  History of Present Illness    Gorge Barksdale is a 23-year-old female who presents for evaluation of leg cramps.    She has intermittent cramping in her legs. It feels like a Charley horse, or it will twitch. The cramping is mainly in her right leg. [place] the [place] and thought exercising would help, but she could not walk for 4 days afterwards because her legs were stiff. She has been trying to exercise it out, which sometimes helps, but sometimes it makes it worse. The muscle cramps occur mainly when she walks. Her leg will stiffen up like it is starting a Charley horse, it becomes \"heavy\" like she cannot walk on it. She has not noticed any swelling or redness. She is not taking any supplements. She admits that she is not drinking enough water. She feels like she is eating well but is not sure of her sodium intake.     She is concerned because she thinks she is urinating more than her fluid intake. After having a sip of water before bed, she was woken out of her sleep 3 times within an hour to urinate. She wonders if it is related to her birth control. She would like to be tested for urinary tract infection (UTI). She is experiencing a burning sensation, but it is not with urination. She is currently taking amoxicillin for her teeth. Her symptoms have improved slightly since starting the antibiotic.     She reports experiencing diarrhea after starting Zoloft and Lexapro. She discontinued taking both medications. She has an appointment with [facility] next week.          Objective   Vital Signs:  /72 (BP Location: Right arm, Patient Position: Sitting, Cuff Size: Adult)   Pulse 99   Temp 97.7 °F (36.5 °C) (Temporal)   Resp 18   Ht 165.1 cm (65\")   Wt 114 kg (250 lb 11.2 oz)   SpO2 98%   BMI 41.72 kg/m² " "  Estimated body mass index is 41.72 kg/m² as calculated from the following:    Height as of this encounter: 165.1 cm (65\").    Weight as of this encounter: 114 kg (250 lb 11.2 oz).          Physical Exam  Constitutional:       General: She is not in acute distress.     Appearance: She is well-developed. She is not diaphoretic.   Cardiovascular:      Rate and Rhythm: Normal rate and regular rhythm.      Heart sounds: Normal heart sounds. No murmur heard.     No friction rub. No gallop.   Pulmonary:      Effort: Pulmonary effort is normal. No respiratory distress.      Breath sounds: Normal breath sounds. No wheezing or rales.   Abdominal:      General: Bowel sounds are normal. There is no distension.      Palpations: Abdomen is soft.      Tenderness: There is no abdominal tenderness.   Musculoskeletal:      Cervical back: Neck supple.   Skin:     General: Skin is warm and dry.   Neurological:      Mental Status: She is alert and oriented to person, place, and time.        Result Review :    Lab work completed in 01/2024 was reviewed with the patient. CMP showed her creatinine level was slightly elevated.     POCT urinalysis dipstick completed in-office today was negative for bacteria.                   Assessment and Plan   Diagnoses and all orders for this visit:    1. Leg cramping (Primary)  -     Comprehensive metabolic panel  -     Magnesium    2. Urinary frequency  -     POC Urinalysis Dipstick, Automated  -     Urine Culture - Urine, Urine, Clean Catch        1. Leg cramps  - She looks dehydrated based on the specific gravity in her POC urinalysis completed in the office today.  - She will complete lab work to include CMP and magnesium.   - She was advised to start over-the-counter magnesium supplementation.    2. Urinary frequency  - An order has been placed for POCT urinalysis dipstick to be completed in the office today. It was negative for bacteria.    - An order has been placed for urine culture.        "     Follow Up   No follow-ups on file.  Patient was given instructions and counseling regarding her condition or for health maintenance advice. Please see specific information pulled into the AVS if appropriate.       Transcribed from ambient dictation for YAZMIN Figueroa by Fabby Payne.  03/14/24   14:51 EDT    Patient or patient representative verbalized consent to the visit recording.  I have personally performed the services described in this document as transcribed by the above individual, and it is both accurate and complete.

## 2024-03-15 LAB
ALBUMIN SERPL-MCNC: 4.3 G/DL (ref 3.5–5.2)
ALBUMIN/GLOB SERPL: 1.7 G/DL
ALP SERPL-CCNC: 59 U/L (ref 39–117)
ALT SERPL-CCNC: 21 U/L (ref 1–33)
AST SERPL-CCNC: 13 U/L (ref 1–32)
BILIRUB SERPL-MCNC: 0.3 MG/DL (ref 0–1.2)
BUN SERPL-MCNC: 10 MG/DL (ref 6–20)
BUN/CREAT SERPL: 11.2 (ref 7–25)
CALCIUM SERPL-MCNC: 9.2 MG/DL (ref 8.6–10.5)
CHLORIDE SERPL-SCNC: 107 MMOL/L (ref 98–107)
CO2 SERPL-SCNC: 19.7 MMOL/L (ref 22–29)
CREAT SERPL-MCNC: 0.89 MG/DL (ref 0.57–1)
EGFRCR SERPLBLD CKD-EPI 2021: 93 ML/MIN/1.73
GLOBULIN SER CALC-MCNC: 2.6 GM/DL
GLUCOSE SERPL-MCNC: 95 MG/DL (ref 65–99)
MAGNESIUM SERPL-MCNC: 2.1 MG/DL (ref 1.6–2.6)
POTASSIUM SERPL-SCNC: 3.8 MMOL/L (ref 3.5–5.2)
PROT SERPL-MCNC: 6.9 G/DL (ref 6–8.5)
SODIUM SERPL-SCNC: 137 MMOL/L (ref 136–145)

## 2024-03-16 LAB
BACTERIA UR CULT: NORMAL
BACTERIA UR CULT: NORMAL

## 2024-07-29 ENCOUNTER — TELEPHONE (OUTPATIENT)
Dept: OBSTETRICS AND GYNECOLOGY | Age: 24
End: 2024-07-29
Payer: COMMERCIAL

## 2024-07-29 NOTE — TELEPHONE ENCOUNTER
Pt has been taking Nextstellis samples since January, Pt c/o leg cramps, back pain, cramps, and longer menses. Pt is asking if there is something else she can take and if she can have samples of that pill instead

## 2024-09-16 ENCOUNTER — TELEPHONE (OUTPATIENT)
Dept: OBSTETRICS AND GYNECOLOGY | Age: 24
End: 2024-09-16
Payer: COMMERCIAL

## 2024-09-16 RX ORDER — DROSPIRENONE 4 MG/1
1 TABLET, FILM COATED ORAL DAILY
Qty: 28 TABLET | Refills: 3 | Status: SHIPPED | OUTPATIENT
Start: 2024-09-16

## 2024-10-07 ENCOUNTER — OFFICE VISIT (OUTPATIENT)
Dept: OBSTETRICS AND GYNECOLOGY | Age: 24
End: 2024-10-07
Payer: COMMERCIAL

## 2024-10-07 VITALS
HEIGHT: 65 IN | DIASTOLIC BLOOD PRESSURE: 78 MMHG | BODY MASS INDEX: 45.15 KG/M2 | SYSTOLIC BLOOD PRESSURE: 126 MMHG | WEIGHT: 271 LBS

## 2024-10-07 DIAGNOSIS — E66.01 MORBID OBESITY WITH BMI OF 45.0-49.9, ADULT: ICD-10-CM

## 2024-10-07 DIAGNOSIS — N94.6 DYSMENORRHEA: ICD-10-CM

## 2024-10-07 DIAGNOSIS — Z01.419 WELL WOMAN EXAM WITH ROUTINE GYNECOLOGICAL EXAM: Primary | ICD-10-CM

## 2024-10-07 DIAGNOSIS — R87.610 ASCUS OF CERVIX WITH NEGATIVE HIGH RISK HPV: ICD-10-CM

## 2024-10-07 DIAGNOSIS — R35.0 URINARY FREQUENCY: ICD-10-CM

## 2024-10-07 DIAGNOSIS — Z12.4 ENCOUNTER FOR PAPANICOLAOU SMEAR FOR CERVICAL CANCER SCREENING: ICD-10-CM

## 2024-10-07 DIAGNOSIS — N89.8 VAGINAL LESION: ICD-10-CM

## 2024-10-07 LAB
BILIRUB BLD-MCNC: ABNORMAL MG/DL
CLARITY, POC: CLEAR
COLOR UR: YELLOW
GLUCOSE UR STRIP-MCNC: NEGATIVE MG/DL
KETONES UR QL: NEGATIVE
LEUKOCYTE EST, POC: NEGATIVE
NITRITE UR-MCNC: NEGATIVE MG/ML
PH UR: 5.5 [PH] (ref 5–8)
PROT UR STRIP-MCNC: NEGATIVE MG/DL
RBC # UR STRIP: NEGATIVE /UL
SP GR UR: 1.03 (ref 1–1.03)
UROBILINOGEN UR QL: NORMAL

## 2024-10-07 PROCEDURE — 81002 URINALYSIS NONAUTO W/O SCOPE: CPT | Performed by: PHYSICIAN ASSISTANT

## 2024-10-07 PROCEDURE — 99395 PREV VISIT EST AGE 18-39: CPT | Performed by: PHYSICIAN ASSISTANT

## 2024-10-07 PROCEDURE — 99213 OFFICE O/P EST LOW 20 MIN: CPT | Performed by: PHYSICIAN ASSISTANT

## 2024-10-07 RX ORDER — NYSTATIN 100000 [USP'U]/G
POWDER TOPICAL 3 TIMES DAILY
Qty: 30 G | Refills: 1 | Status: SHIPPED | OUTPATIENT
Start: 2024-10-07

## 2024-10-07 RX ORDER — LORAZEPAM 1 MG/1
TABLET ORAL
COMMUNITY
Start: 2024-09-04

## 2024-10-07 RX ORDER — CITALOPRAM HYDROBROMIDE 40 MG/1
TABLET ORAL
COMMUNITY

## 2024-10-07 RX ORDER — ETONOGESTREL AND ETHINYL ESTRADIOL VAGINAL RING .015; .12 MG/D; MG/D
RING VAGINAL
Qty: 1 EACH | Refills: 12 | Status: SHIPPED | OUTPATIENT
Start: 2024-10-07

## 2024-10-07 RX ORDER — NYSTATIN AND TRIAMCINOLONE ACETONIDE 100000; 1 [USP'U]/G; MG/G
1 OINTMENT TOPICAL 2 TIMES DAILY
Qty: 30 G | Refills: 0 | Status: SHIPPED | OUTPATIENT
Start: 2024-10-07

## 2024-10-07 NOTE — PROGRESS NOTES
Subjective     Chief Complaint   Patient presents with    Annual Exam     Annual exam last pap 2023 Ascus/neg hpv, c/o nausea at random times, very emotional not typical of her, orgasms are extremely  painful --lower abdominal and goes into thighs, no libido, at times she has lower back pain (sometimes sharp) , bloated, cuts on entrance to vagina--there are more now and they open and heal then they come back, weight gain.       History of Present Illness    Gorge Barksdale is a 24 y.o.  who presents for annual exam.    Shy is here for her annual exam  She Is accompanied  by her Galo Moore    She is noting pain with IC and orgasms  Has noted that her pills have helped with some of her pain but still has painful periods  Was on  nextsellis but now on slynd for the past 3 months  Dealing with nausea every day  Also noting acid reflux   Unsure if this is all being caused by her pill    Has family h/o endometriosis  We discussed possible diagnostic lap  She is considering this  Also discussed possibly getting an IUD which could offer BC and pain control  Could consider doing this at the same time     Is in a relationship but open to STD testing    Her menses are regular every 28-30 days, lasting 4-7 days, dysmenorrhea moderate, occurring first 1-2 days of flow   Obstetric History:  OB History          0    Para   0    Term   0       0    AB   0    Living   0         SAB   0    IAB   0    Ectopic   0    Molar   0    Multiple   0    Live Births   0               Menstrual History:     No LMP recorded (lmp unknown). (Menstrual status: Oral contraceptives).         Current contraception: oral progesterone-only contraceptive  History of abnormal Pap smear: yes - ascus/neg hpv  Received Gardasil immunization: yes  Perform regular self breast exam: yes - occl  Family history of uterine or ovarian cancer: yes - PGM with uterine cancer  Family History of colon cancer: no  Family history of breast  "cancer: no    Mammogram: not indicated.  Colonoscopy: not indicated.  DEXA: not indicated.    Exercise: not active  Calcium/Vitamin D: adequate intake    The following portions of the patient's history were reviewed and updated as appropriate: allergies, current medications, past family history, past medical history, past social history, past surgical history, and problem list.    Review of Systems    Review of Systems   Constitutional: Negative for fatigue.   Respiratory: Negative for shortness of breath.    Gastrointestinal: Negative for abdominal pain.   Genitourinary: Negative for dysuria.   Neurological: Negative for headaches.   Psychiatric/Behavioral: Negative for dysphoric mood.         Objective   Physical Exam  Genitourinary:            /78   Ht 165.1 cm (65\")   Wt 123 kg (271 lb)   LMP  (LMP Unknown)   BMI 45.10 kg/m²   General:   alert, comfortable, and no distress   Heart: regular rate and rhythm   Lungs: clear to auscultation bilaterally   Breast: normal appearance, no masses or tenderness, Inspection negative, No nipple retraction or dimpling, No nipple discharge or bleeding, No axillary or supraclavicular adenopathy, Normal to palpation without dominant masses   Neck: no adenopathy and no carotid bruit   Abdomen: Not performed today   CVA: Not performed today   Pelvis: External genitalia: normal general appearance  Vaginal: see pics  Cervix: normal appearance, thin prep PAP obtained, and GC prep obtained  Adnexa: normal bimanual exam and non palpable  Uterus: tender and ?fixed  Exam limited by body habitus   Extremities: Not performed today   Neurologic: negative   Psychiatric: Normal affect, judgement, and mood     Assessment & Plan   Diagnoses and all orders for this visit:    1. Well woman exam with routine gynecological exam (Primary)    2. Dysmenorrhea    3. Encounter for Papanicolaou smear for cervical cancer screening    4. ASCUS of cervix with negative high risk HPV  -     Cancel: " IGP, Rfx Aptima HPV ASCU  -     IGP, CtNg, Rfx Aptima HPV ASCU    5. Morbid obesity with BMI of 45.0-49.9, adult  -     Hemoglobin A1c    6. Urinary frequency  -     POC Urinalysis Dipstick    7. Vaginal lesion  -     Herpes Simplex Virus (HSV) 1 & 2, BELEN    Other orders  -     etonogestrel-ethinyl estradiol (NuvaRing) 0.12-0.015 MG/24HR vaginal ring; Insert vaginally and leave in place for 3 consecutive weeks, then remove for 1 week.  Dispense: 1 each; Refill: 12  -     nystatin-triamcinolone (MYCOLOG) 755538-3.1 UNIT/GM-% ointment; Apply 1 Application topically to the appropriate area as directed 2 (Two) Times a Day.  Dispense: 30 g; Refill: 0  -     nystatin (MYCOSTATIN) 872635 UNIT/GM powder; Apply  topically to the appropriate area as directed 3 (Three) Times a Day.  Dispense: 30 g; Refill: 1        All questions answered.  Breast self exam technique reviewed and patient encouraged to perform self-exam monthly.  Discussed healthy lifestyle modifications.  Recommended 30 minutes of aerobic exercise five times per week.  Discussed calcium needs to prevent osteoporosis.    Pap done with std testing  Swab done to check for herpes  Labs ordered to check A1C, groin fungal infection noted and vaginal tears/lacerations  Topical cream and powder sent it to treat yeast  Switch to nuvaring to see if nausea improves  Disc iud as option for BC and pain control. Could consider insert when/if proceeds with Diagnostic laparoscopy

## 2024-10-08 LAB — HBA1C MFR BLD: 4.7 % (ref 4.8–5.6)

## 2024-10-10 LAB
HSV1 DNA SPEC QL NAA+PROBE: NEGATIVE
HSV2 DNA SPEC QL NAA+PROBE: NEGATIVE

## 2024-10-14 LAB
C TRACH RRNA CVX QL NAA+PROBE: NEGATIVE
CONV .: NORMAL
CYTOLOGIST CVX/VAG CYTO: NORMAL
CYTOLOGY CVX/VAG DOC CYTO: NORMAL
CYTOLOGY CVX/VAG DOC THIN PREP: NORMAL
DX ICD CODE: NORMAL
Lab: NORMAL
N GONORRHOEA RRNA CVX QL NAA+PROBE: NEGATIVE
OTHER STN SPEC: NORMAL
PATHOLOGIST CVX/VAG CYTO: NORMAL
STAT OF ADQ CVX/VAG CYTO-IMP: NORMAL

## 2024-10-20 ENCOUNTER — HOSPITAL ENCOUNTER (EMERGENCY)
Facility: HOSPITAL | Age: 24
Discharge: HOME | End: 2024-10-20
Attending: EMERGENCY MEDICINE | Admitting: EMERGENCY MEDICINE
Payer: COMMERCIAL

## 2024-10-20 ENCOUNTER — APPOINTMENT (OUTPATIENT)
Dept: CT IMAGING | Facility: HOSPITAL | Age: 24
End: 2024-10-20
Payer: COMMERCIAL

## 2024-10-20 VITALS
HEIGHT: 65 IN | SYSTOLIC BLOOD PRESSURE: 108 MMHG | OXYGEN SATURATION: 97 % | DIASTOLIC BLOOD PRESSURE: 70 MMHG | TEMPERATURE: 100.1 F | WEIGHT: 263.67 LBS | RESPIRATION RATE: 18 BRPM | BODY MASS INDEX: 43.93 KG/M2 | HEART RATE: 87 BPM

## 2024-10-20 DIAGNOSIS — R11.2 NAUSEA AND VOMITING, UNSPECIFIED VOMITING TYPE: Primary | ICD-10-CM

## 2024-10-20 LAB
ALBUMIN SERPL-MCNC: 4.4 G/DL (ref 3.5–5.2)
ALBUMIN/GLOB SERPL: 1.3 G/DL
ALP SERPL-CCNC: 47 U/L (ref 39–117)
ALT SERPL W P-5'-P-CCNC: 41 U/L (ref 1–33)
AMPHET+METHAMPHET UR QL: NEGATIVE
AMPHETAMINES UR QL: NEGATIVE
ANION GAP SERPL CALCULATED.3IONS-SCNC: 14 MMOL/L (ref 5–15)
AST SERPL-CCNC: 36 U/L (ref 1–32)
B-HCG UR QL: NEGATIVE
BACTERIA UR QL AUTO: ABNORMAL /HPF
BARBITURATES UR QL SCN: NEGATIVE
BASOPHILS # BLD AUTO: 0.04 10*3/MM3 (ref 0–0.2)
BASOPHILS NFR BLD AUTO: 0.6 % (ref 0–1.5)
BENZODIAZ UR QL SCN: POSITIVE
BILIRUB SERPL-MCNC: 0.3 MG/DL (ref 0–1.2)
BILIRUB UR QL STRIP: ABNORMAL
BUN SERPL-MCNC: 8 MG/DL (ref 6–20)
BUN/CREAT SERPL: 8.3 (ref 7–25)
BUPRENORPHINE SERPL-MCNC: NEGATIVE NG/ML
CALCIUM SPEC-SCNC: 9.4 MG/DL (ref 8.6–10.5)
CANNABINOIDS SERPL QL: POSITIVE
CHLORIDE SERPL-SCNC: 108 MMOL/L (ref 98–107)
CLARITY UR: ABNORMAL
CO2 SERPL-SCNC: 15 MMOL/L (ref 22–29)
COCAINE UR QL: NEGATIVE
COLOR UR: YELLOW
CREAT SERPL-MCNC: 0.96 MG/DL (ref 0.57–1)
DEPRECATED RDW RBC AUTO: 41.1 FL (ref 37–54)
EGFRCR SERPLBLD CKD-EPI 2021: 84.4 ML/MIN/1.73
EOSINOPHIL # BLD AUTO: 0.05 10*3/MM3 (ref 0–0.4)
EOSINOPHIL NFR BLD AUTO: 0.7 % (ref 0.3–6.2)
ERYTHROCYTE [DISTWIDTH] IN BLOOD BY AUTOMATED COUNT: 12.4 % (ref 12.3–15.4)
GLOBULIN UR ELPH-MCNC: 3.3 GM/DL
GLUCOSE SERPL-MCNC: 106 MG/DL (ref 65–99)
GLUCOSE UR STRIP-MCNC: NEGATIVE MG/DL
HCT VFR BLD AUTO: 42.5 % (ref 34–46.6)
HGB BLD-MCNC: 14.3 G/DL (ref 12–15.9)
HGB UR QL STRIP.AUTO: NEGATIVE
HYALINE CASTS UR QL AUTO: ABNORMAL /LPF
IMM GRANULOCYTES # BLD AUTO: 0.02 10*3/MM3 (ref 0–0.05)
IMM GRANULOCYTES NFR BLD AUTO: 0.3 % (ref 0–0.5)
KETONES UR QL STRIP: ABNORMAL
LARGE PLATELETS: NORMAL
LEUKOCYTE ESTERASE UR QL STRIP.AUTO: ABNORMAL
LIPASE SERPL-CCNC: 16 U/L (ref 13–60)
LYMPHOCYTES # BLD AUTO: 1.31 10*3/MM3 (ref 0.7–3.1)
LYMPHOCYTES NFR BLD AUTO: 19.6 % (ref 19.6–45.3)
MCH RBC QN AUTO: 30.7 PG (ref 26.6–33)
MCHC RBC AUTO-ENTMCNC: 33.6 G/DL (ref 31.5–35.7)
MCV RBC AUTO: 91.2 FL (ref 79–97)
METHADONE UR QL SCN: NEGATIVE
MONOCYTES # BLD AUTO: 0.57 10*3/MM3 (ref 0.1–0.9)
MONOCYTES NFR BLD AUTO: 8.5 % (ref 5–12)
NEUTROPHILS NFR BLD AUTO: 4.68 10*3/MM3 (ref 1.7–7)
NEUTROPHILS NFR BLD AUTO: 70.3 % (ref 42.7–76)
NITRITE UR QL STRIP: NEGATIVE
NRBC BLD AUTO-RTO: 0 /100 WBC (ref 0–0.2)
OPIATES UR QL: NEGATIVE
OXYCODONE UR QL SCN: NEGATIVE
PCP UR QL SCN: NEGATIVE
PH UR STRIP.AUTO: 5.5 [PH] (ref 5–8)
PLATELET # BLD AUTO: 268 10*3/MM3 (ref 140–450)
PMV BLD AUTO: 10.9 FL (ref 6–12)
POTASSIUM SERPL-SCNC: 3.6 MMOL/L (ref 3.5–5.2)
PROT SERPL-MCNC: 7.7 G/DL (ref 6–8.5)
PROT UR QL STRIP: ABNORMAL
RBC # BLD AUTO: 4.66 10*6/MM3 (ref 3.77–5.28)
RBC # UR STRIP: ABNORMAL /HPF
RBC MORPH BLD: NORMAL
REF LAB TEST METHOD: ABNORMAL
SODIUM SERPL-SCNC: 137 MMOL/L (ref 136–145)
SP GR UR STRIP: 1.03 (ref 1–1.03)
SQUAMOUS #/AREA URNS HPF: ABNORMAL /HPF
TRICYCLICS UR QL SCN: NEGATIVE
UROBILINOGEN UR QL STRIP: ABNORMAL
WBC # UR STRIP: ABNORMAL /HPF
WBC MORPH BLD: NORMAL
WBC NRBC COR # BLD AUTO: 6.67 10*3/MM3 (ref 3.4–10.8)

## 2024-10-20 PROCEDURE — 25010000002 DROPERIDOL PER 5 MG: Performed by: EMERGENCY MEDICINE

## 2024-10-20 PROCEDURE — 93005 ELECTROCARDIOGRAM TRACING: CPT | Performed by: EMERGENCY MEDICINE

## 2024-10-20 PROCEDURE — 74177 CT ABD & PELVIS W/CONTRAST: CPT

## 2024-10-20 PROCEDURE — 25510000001 IOPAMIDOL PER 1 ML: Performed by: EMERGENCY MEDICINE

## 2024-10-20 PROCEDURE — 85025 COMPLETE CBC W/AUTO DIFF WBC: CPT | Performed by: EMERGENCY MEDICINE

## 2024-10-20 PROCEDURE — 83690 ASSAY OF LIPASE: CPT | Performed by: EMERGENCY MEDICINE

## 2024-10-20 PROCEDURE — 87086 URINE CULTURE/COLONY COUNT: CPT | Performed by: EMERGENCY MEDICINE

## 2024-10-20 PROCEDURE — 85007 BL SMEAR W/DIFF WBC COUNT: CPT | Performed by: EMERGENCY MEDICINE

## 2024-10-20 PROCEDURE — 99285 EMERGENCY DEPT VISIT HI MDM: CPT

## 2024-10-20 PROCEDURE — 81025 URINE PREGNANCY TEST: CPT | Performed by: EMERGENCY MEDICINE

## 2024-10-20 PROCEDURE — 96375 TX/PRO/DX INJ NEW DRUG ADDON: CPT

## 2024-10-20 PROCEDURE — 80053 COMPREHEN METABOLIC PANEL: CPT | Performed by: EMERGENCY MEDICINE

## 2024-10-20 PROCEDURE — 80306 DRUG TEST PRSMV INSTRMNT: CPT | Performed by: EMERGENCY MEDICINE

## 2024-10-20 PROCEDURE — 96374 THER/PROPH/DIAG INJ IV PUSH: CPT

## 2024-10-20 PROCEDURE — 25010000002 DIPHENHYDRAMINE PER 50 MG: Performed by: EMERGENCY MEDICINE

## 2024-10-20 PROCEDURE — 81001 URINALYSIS AUTO W/SCOPE: CPT | Performed by: EMERGENCY MEDICINE

## 2024-10-20 RX ORDER — SODIUM CHLORIDE 0.9 % (FLUSH) 0.9 %
10 SYRINGE (ML) INJECTION AS NEEDED
Status: DISCONTINUED | OUTPATIENT
Start: 2024-10-20 | End: 2024-10-20 | Stop reason: HOSPADM

## 2024-10-20 RX ORDER — DIPHENHYDRAMINE HYDROCHLORIDE 50 MG/ML
12.5 INJECTION INTRAMUSCULAR; INTRAVENOUS ONCE
Status: COMPLETED | OUTPATIENT
Start: 2024-10-20 | End: 2024-10-20

## 2024-10-20 RX ORDER — METOCLOPRAMIDE 5 MG/1
5 TABLET ORAL 3 TIMES DAILY PRN
Qty: 12 TABLET | Refills: 0 | Status: SHIPPED | OUTPATIENT
Start: 2024-10-20

## 2024-10-20 RX ORDER — IOPAMIDOL 755 MG/ML
100 INJECTION, SOLUTION INTRAVASCULAR
Status: COMPLETED | OUTPATIENT
Start: 2024-10-20 | End: 2024-10-20

## 2024-10-20 RX ORDER — DROPERIDOL 2.5 MG/ML
1.25 INJECTION, SOLUTION INTRAMUSCULAR; INTRAVENOUS ONCE
Status: COMPLETED | OUTPATIENT
Start: 2024-10-20 | End: 2024-10-20

## 2024-10-20 RX ADMIN — DIPHENHYDRAMINE HYDROCHLORIDE 12.5 MG: 50 INJECTION, SOLUTION INTRAMUSCULAR; INTRAVENOUS at 17:00

## 2024-10-20 RX ADMIN — IOPAMIDOL 100 ML: 755 INJECTION, SOLUTION INTRAVENOUS at 18:12

## 2024-10-20 RX ADMIN — DROPERIDOL 1.25 MG: 2.5 INJECTION, SOLUTION INTRAMUSCULAR; INTRAVENOUS at 17:00

## 2024-10-20 NOTE — ED PROVIDER NOTES
Subjective   History of Present Illness  24-year-old female with prior medical history of anxiety, TBI, presents for nausea vomiting and diarrhea going on for a month.  Having some mild left-sided abdominal pain intermittently as well.  States she has had some intermittent dysuria but has not been consistent.  No fevers.  Smokes marijuana very frequently.  No fevers.  Has intermittently had some suicidal thoughts but not having currently.  States when she wakes up in the morning she feels so bad that she thinks of it but has not had a plan.  States she is not suicidal now.  Has taken 2 pregnancy test that were both negative.  Review of Systems  See HPI.  Past Medical History:   Diagnosis Date    Abnormal Pap smear of cervix     Anxiety     Depression     Fibromyalgia, primary     Headache     Migraine     PMS (premenstrual syndrome)     Substance abuse 2018    Marijuana    Traumatic brain injury     Urinary tract infection        Allergies   Allergen Reactions    Shrimp Itching     Itchy throat       Past Surgical History:   Procedure Laterality Date    EAR BIOPSY      times 4    TONSILLECTOMY         Family History   Problem Relation Age of Onset    Lupus Mother     Rheum arthritis Mother     Arthritis Mother     Depression Mother     No Known Problems Father     Cancer Paternal Grandfather     Diabetes Paternal Grandfather     Mental illness Paternal Grandfather     Asthma Brother     Ovarian cancer Paternal Grandmother        Social History     Socioeconomic History    Marital status: Single   Tobacco Use    Smoking status: Never     Passive exposure: Never    Smokeless tobacco: Never   Vaping Use    Vaping status: Never Used   Substance and Sexual Activity    Alcohol use: Yes     Alcohol/week: 1.0 standard drink of alcohol     Types: 1 Drinks containing 0.5 oz of alcohol per week    Drug use: Yes     Frequency: 3.5 times per week     Types: Marijuana    Sexual activity: Yes     Partners: Male     Birth  "control/protection: Condom, Birth control pill, Pill           Objective   Physical Exam  No acute distress, tachycardic rate and regular rhythm, elevated BMI, no CVA tenderness palpation bilaterally, mild left upper quadrant tenderness palpation without rebound or guarding, no tachypnea or increased work of breathing, no scleral icterus, normal conjunctiva, alert and oriented, moving all extremities.  Procedures           ED Course      /70   Pulse 87   Temp 100.1 °F (37.8 °C) (Oral)   Resp 18   Ht 165.1 cm (65\")   Wt 120 kg (263 lb 10.7 oz)   LMP  (LMP Unknown)   SpO2 97%   BMI 43.88 kg/m²   Labs Reviewed   COMPREHENSIVE METABOLIC PANEL - Abnormal; Notable for the following components:       Result Value    Glucose 106 (*)     Chloride 108 (*)     CO2 15.0 (*)     ALT (SGPT) 41 (*)     AST (SGOT) 36 (*)     All other components within normal limits    Narrative:     GFR Normal >60  Chronic Kidney Disease <60  Kidney Failure <15     URINALYSIS W/ MICROSCOPIC IF INDICATED (NO CULTURE) - Abnormal; Notable for the following components:    Appearance, UA Turbid (*)     Ketones, UA 40 mg/dL (2+) (*)     Bilirubin, UA Moderate (2+) (*)     Protein, UA 30 mg/dL (1+) (*)     Leuk Esterase, UA Trace (*)     All other components within normal limits   URINALYSIS, MICROSCOPIC ONLY - Abnormal; Notable for the following components:    WBC, UA 3-5 (*)     Bacteria, UA 4+ (*)     All other components within normal limits   URINE DRUG SCREEN - Abnormal; Notable for the following components:    THC, Screen, Urine Positive (*)     Benzodiazepine Screen, Urine Positive (*)     All other components within normal limits    Narrative:     Cutoff For Drugs Screened:    Amphetamines               500 ng/ml  Barbiturates               200 ng/ml  Benzodiazepines            150 ng/ml  Cocaine                    150 ng/ml  Methadone                  200 ng/ml  Opiates                    100 ng/ml  Phencyclidine               25 " ng/ml  THC                         50 ng/ml  Methamphetamine            500 ng/ml  Tricyclic Antidepressants  300 ng/ml  Oxycodone                  100 ng/ml  Buprenorphine               10 ng/ml    The normal value for all drugs tested is negative. This report includes unconfirmed screening results, with the cutoff values listed, to be used for medical treatment purposes only.  Unconfirmed results must not be used for non-medical purposes such as employment or legal testing.  Clinical consideration should be applied to any drug of abuse test, particularly when unconfirmed results are used.    All urine drugs of abuse requests without chain of custody are for medical screening purposes only.  False positives are possible.     LIPASE - Normal   PREGNANCY, URINE - Normal   CBC WITH AUTO DIFFERENTIAL - Normal   URINE CULTURE   SCAN SLIDE   CBC AND DIFFERENTIAL    Narrative:     The following orders were created for panel order CBC & Differential.  Procedure                               Abnormality         Status                     ---------                               -----------         ------                     CBC Auto Differential[[id number]]        Normal              Final result               Scan Slide[[id number]]                                       Final result                 Please view results for these tests on the individual orders.     .DMEDS  CT Abdomen Pelvis With Contrast   Final Result   No acute abdominal or pelvic pathology. No rib fractures.               Electronically Signed: Nikolay Guillen MD     10/20/2024 6:28 PM EDT     Workstation ID: [id number]                                               Medical Decision Making    EKG interpretation: 4:25 PM, rate 104, sinus tachycardia, normal axis, normal intervals, no acute ischemic changes.    Patient denying SI at this time.  States she occasionally has had some SI thoughts previously but has not had a plan.  Patient states she feels significantly  better after droperidol.  Discussed concern for cannabis hyperemesis with patient recommended that she cease marijuana use.  Labs and CT reassuring.  Will DC home.  Patient continues to deny SI at this time.  Return ER precautions discussed.  Final diagnoses:   Nausea and vomiting, unspecified vomiting type       ED Disposition  ED Disposition       ED Disposition   Discharge    Condition   Stable    Comment   --               Miri Segundo, APRN  [address]  [address]  [place] [address]  [phone]    In 3 days           Medication List        New Prescriptions      metoclopramide 5 MG tablet  Commonly known as: REGLAN  Take 1 tablet by mouth 3 (Three) Times a Day As Needed (nausea and vomiting).               Where to Get Your Medications        These medications were sent to [id number] - [place], [place] - [address] - [phone]  - [phone] FX  [address] [place] [address]      Phone: [phone]   metoclopramide 5 MG tablet            Liban Garcia MD  10/21/24 0009

## 2024-10-20 NOTE — ED NOTES
Belongings taken from pt and put in a bag with a pt label. Bag given to security. There was a purple t-shirt, a pair of sandals, a bra, and a pair of leggings. Pt also had sunglasses that were given to grandmother.

## 2024-10-20 NOTE — ED NOTES
Patient triggered moderate risk on SI screening. Charge RN made aware of close-watch patient. Suicide precautions ordered per protocol. Pt remains in triage while room is being prepared for pt.

## 2024-10-21 LAB
QT INTERVAL: 356 MS
QTC INTERVAL: 469 MS

## 2024-10-22 LAB — BACTERIA SPEC AEROBE CULT: NORMAL

## 2024-11-19 ENCOUNTER — TELEPHONE (OUTPATIENT)
Dept: OBSTETRICS AND GYNECOLOGY | Age: 24
End: 2024-11-19
Payer: COMMERCIAL

## 2024-11-19 NOTE — TELEPHONE ENCOUNTER
Tr[facility] recommended pt consult with you for Diagnostic lap for endometriosis and poss iud insert in or,please advise on scheduling

## 2024-11-19 NOTE — TELEPHONE ENCOUNTER
PT is wanting to consult with Dr. Salazar. Please see previous messages and let me know where it would be best to put PT on for consult.

## 2025-04-08 DIAGNOSIS — F41.9 ANXIETY AND DEPRESSION: ICD-10-CM

## 2025-04-08 DIAGNOSIS — Z00.00 ANNUAL PHYSICAL EXAM: ICD-10-CM

## 2025-04-08 DIAGNOSIS — F32.A ANXIETY AND DEPRESSION: ICD-10-CM

## 2025-04-08 DIAGNOSIS — E55.9 VITAMIN D DEFICIENCY: ICD-10-CM

## 2025-04-08 DIAGNOSIS — Z00.00 ANNUAL PHYSICAL EXAM: Primary | ICD-10-CM

## 2025-04-08 DIAGNOSIS — E53.8 VITAMIN B12 DEFICIENCY: ICD-10-CM

## 2025-04-23 ENCOUNTER — TELEPHONE (OUTPATIENT)
Dept: FAMILY MEDICINE CLINIC | Facility: CLINIC | Age: 25
End: 2025-04-23
Payer: COMMERCIAL

## 2025-04-23 NOTE — TELEPHONE ENCOUNTER
----- Message from Vijaya SHARMA sent at 4/23/2025  6:59 AM EDT -----  Pt needs to be umang with PCP and labs prior. Please contact and umang.  ----- Message -----  From: SYSTEM  Sent: 4/23/2025   1:10 AM EDT  To: Mayra Guerrero [facility]